# Patient Record
Sex: MALE | Race: WHITE | NOT HISPANIC OR LATINO | Employment: OTHER | URBAN - METROPOLITAN AREA
[De-identification: names, ages, dates, MRNs, and addresses within clinical notes are randomized per-mention and may not be internally consistent; named-entity substitution may affect disease eponyms.]

---

## 2017-05-05 ENCOUNTER — TRANSFERRED RECORDS (OUTPATIENT)
Dept: HEALTH INFORMATION MANAGEMENT | Facility: CLINIC | Age: 67
End: 2017-05-05

## 2017-07-21 ENCOUNTER — TRANSFERRED RECORDS (OUTPATIENT)
Dept: HEALTH INFORMATION MANAGEMENT | Facility: CLINIC | Age: 67
End: 2017-07-21

## 2017-07-27 ENCOUNTER — TRANSFERRED RECORDS (OUTPATIENT)
Dept: HEALTH INFORMATION MANAGEMENT | Facility: CLINIC | Age: 67
End: 2017-07-27

## 2017-07-28 ENCOUNTER — TRANSFERRED RECORDS (OUTPATIENT)
Dept: HEALTH INFORMATION MANAGEMENT | Facility: CLINIC | Age: 67
End: 2017-07-28

## 2017-08-24 ENCOUNTER — TRANSFERRED RECORDS (OUTPATIENT)
Dept: HEALTH INFORMATION MANAGEMENT | Facility: CLINIC | Age: 67
End: 2017-08-24

## 2017-08-31 ENCOUNTER — TRANSFERRED RECORDS (OUTPATIENT)
Dept: HEALTH INFORMATION MANAGEMENT | Facility: CLINIC | Age: 67
End: 2017-08-31

## 2017-09-01 ENCOUNTER — TRANSFERRED RECORDS (OUTPATIENT)
Dept: HEALTH INFORMATION MANAGEMENT | Facility: CLINIC | Age: 67
End: 2017-09-01

## 2017-09-10 ENCOUNTER — TRANSFERRED RECORDS (OUTPATIENT)
Dept: HEALTH INFORMATION MANAGEMENT | Facility: CLINIC | Age: 67
End: 2017-09-10

## 2017-09-13 ENCOUNTER — TRANSFERRED RECORDS (OUTPATIENT)
Dept: HEALTH INFORMATION MANAGEMENT | Facility: CLINIC | Age: 67
End: 2017-09-13

## 2017-09-21 ENCOUNTER — TRANSFERRED RECORDS (OUTPATIENT)
Dept: HEALTH INFORMATION MANAGEMENT | Facility: CLINIC | Age: 67
End: 2017-09-21

## 2019-12-06 ENCOUNTER — DOCUMENTATION ONLY (OUTPATIENT)
Dept: CARE COORDINATION | Facility: CLINIC | Age: 69
End: 2019-12-06

## 2019-12-06 NOTE — TELEPHONE ENCOUNTER
MEDICAL RECORDS REQUEST   [facility]  Urology Clinic  [address] [place] [address]  PHONE: [phone]  Fax: [phone]        FUTURE VISIT INFORMATION                                                   TOI Caruso: 1950 scheduled for future visit at [facility]    APPOINTMENT INFORMATION:    Date: 20 3PM    Provider:  Sury Hartman MD    Reason for Visit/Diagnosis: Elevated PSA    REFERRAL INFORMATION:    Referring provider:  Self    Specialty: N/A    Referring providers clinic:  N/A    Clinic contact number:  N/A    RECORDS REQUESTED FOR VISIT                                                     NOTES  STATUS/DETAILS   OFFICE NOTE from referring provider  no   OFFICE NOTE from other specialist  yes   DISCHARGE SUMMARY from hospital  yes   DISCHARGE REPORT from the ER  yes   OPERATIVE REPORT  yes   MEDICATION LIST  yes   LABS     URINALYSIS (UA) / PSA  yes     PRE-VISIT CHECKLIST      Record collection complete YES- [facility] recs scanned in epic   Images in  PACS    Appointment appropriately scheduled           (right time/right provider) Yes   MyChart activation If no, please explain: In process    Questionnaire complete If no, please explain: In process      Completed by: Delilah Chambers

## 2020-01-10 ENCOUNTER — PRE VISIT (OUTPATIENT)
Dept: UROLOGY | Facility: CLINIC | Age: 70
End: 2020-01-10

## 2020-01-10 DIAGNOSIS — R97.20 ELEVATED PROSTATE SPECIFIC ANTIGEN (PSA): Primary | ICD-10-CM

## 2020-01-10 NOTE — TELEPHONE ENCOUNTER
Chief Complaint : New-From Self     Hx/Sx: Elevated PSA    Records/Orders: PSA Requested     Pt Contacted: Called on 1/10 and VM about New PSA    At Rooming: Normal

## 2020-01-17 ENCOUNTER — PRE VISIT (OUTPATIENT)
Dept: UROLOGY | Facility: CLINIC | Age: 70
End: 2020-01-17

## 2020-02-12 DIAGNOSIS — R97.20 ELEVATED PROSTATE SPECIFIC ANTIGEN (PSA): ICD-10-CM

## 2020-02-12 LAB — PSA SERPL-MCNC: 14.7 UG/L (ref 0–4)

## 2020-02-12 PROCEDURE — 36415 COLL VENOUS BLD VENIPUNCTURE: CPT | Performed by: UROLOGY

## 2020-02-12 PROCEDURE — 84153 ASSAY OF PSA TOTAL: CPT | Performed by: UROLOGY

## 2020-02-14 ENCOUNTER — OFFICE VISIT (OUTPATIENT)
Dept: UROLOGY | Facility: CLINIC | Age: 70
End: 2020-02-14
Payer: COMMERCIAL

## 2020-02-14 VITALS — HEART RATE: 80 BPM | DIASTOLIC BLOOD PRESSURE: 71 MMHG | SYSTOLIC BLOOD PRESSURE: 142 MMHG

## 2020-02-14 DIAGNOSIS — R39.15 URINARY URGENCY: ICD-10-CM

## 2020-02-14 DIAGNOSIS — N20.0 CALCULUS OF KIDNEY: ICD-10-CM

## 2020-02-14 DIAGNOSIS — R97.20 ELEVATED PROSTATE SPECIFIC ANTIGEN (PSA): Primary | ICD-10-CM

## 2020-02-14 RX ORDER — TRAMADOL HYDROCHLORIDE 50 MG/1
50 TABLET ORAL 4 TIMES DAILY PRN
COMMUNITY
Start: 2019-08-22 | End: 2024-06-25

## 2020-02-14 RX ORDER — ATORVASTATIN CALCIUM 10 MG/1
10 TABLET, FILM COATED ORAL DAILY
COMMUNITY
Start: 2019-12-06

## 2020-02-14 RX ORDER — TAMSULOSIN HYDROCHLORIDE 0.4 MG/1
0.4 CAPSULE ORAL DAILY
Status: ON HOLD | COMMUNITY
Start: 2020-01-13 | End: 2022-06-15

## 2020-02-14 ASSESSMENT — ENCOUNTER SYMPTOMS
MEMORY LOSS: 0
BACK PAIN: 0
PARALYSIS: 0
INCREASED ENERGY: 0
DISTURBANCES IN COORDINATION: 0
NUMBNESS: 0
FATIGUE: 0
DECREASED APPETITE: 0
NECK MASS: 0
FLANK PAIN: 0
DYSURIA: 0
POLYDIPSIA: 0
SPEECH CHANGE: 0
NECK PAIN: 1
MUSCLE WEAKNESS: 0
MUSCLE CRAMPS: 0
SINUS CONGESTION: 0
NIGHT SWEATS: 0
ALTERED TEMPERATURE REGULATION: 0
DIFFICULTY URINATING: 0
SEIZURES: 0
TROUBLE SWALLOWING: 0
HEADACHES: 1
HALLUCINATIONS: 0
SORE THROAT: 0
TASTE DISTURBANCE: 0
FEVER: 0
TREMORS: 0
STIFFNESS: 1
SMELL DISTURBANCE: 0
WEIGHT LOSS: 0
JOINT SWELLING: 1
HEMATURIA: 0
SINUS PAIN: 0
HOARSE VOICE: 0
MYALGIAS: 0
TINGLING: 0
WEIGHT GAIN: 0
ARTHRALGIAS: 1
DIZZINESS: 0
WEAKNESS: 0
LOSS OF CONSCIOUSNESS: 0

## 2020-02-14 ASSESSMENT — PAIN SCALES - GENERAL: PAINLEVEL: NO PAIN (0)

## 2020-02-14 NOTE — PATIENT INSTRUCTIONS
Please have MRI and CT completed. Follow up with Dr. Hartman for a cystoscopy appointment within the next few weeks.    It was a pleasure meeting with you today.  Thank you for allowing me and my team the privilege of caring for you today.  YOU are the reason we are here, and I truly hope we provided you with the excellent service you deserve.  Please let us know if there is anything else we can do for you so that we can be sure you are leaving completely satisfied with your care experience.        Balwinder Aldana, EMT

## 2020-02-14 NOTE — NURSING NOTE
Chief Complaint   Patient presents with     Consult For     Elevated PSA       Blood pressure (!) 142/71, pulse 80. There is no height or weight on file to calculate BMI.    There is no problem list on file for this patient.      No Known Allergies    Current Outpatient Medications   Medication Sig Dispense Refill     clonazePAM (KLONOPIN) 1 MG tablet Take 1 tablet by mouth 2 times daily. 60 tablet 5     FLUoxetine HCl, PMDD, 20 MG CAPS Take 4 capsules by mouth daily. 120 capsule 11     tamsulosin (FLOMAX) 0.4 MG capsule Take 0.4 mg by mouth once       traMADol (ULTRAM) 50 MG tablet Take 50 mg by mouth 4 times daily       atorvastatin (LIPITOR) 10 MG tablet Take 10 mg by mouth         Social History     Tobacco Use     Smoking status: Never Smoker     Smokeless tobacco: Never Used   Substance Use Topics     Alcohol use: None     Drug use: None       Balwinder Aldana, EMT  2/14/2020  12:13 PM

## 2020-02-14 NOTE — LETTER
2/14/2020       RE: Boris Zambrano  [address]  [facility] [address]     Dear Colleague,    Thank you for referring your patient, Boris Zambrano, to the [facility] UROLOGY AND INST FOR PROSTATE AND UROLOGIC CANCERS at [facility]. Please see a copy of my visit note below.    It was my pleasure to see Boris Zambrano a 69 year old year old male today. Patient was seen in consultation for elevated PSA and lower urinary tract symptoms.    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     04/2014 7.71  04/2015 11.64  05/2017 11.08  07/2018 11.6  08/2019 16.19  (patient brought these values that he had recorded)     02/2020 14.7      Has had four biopsies in the past but last one was several years ago.     Patient with lower urinary tract symptoms including frequency, taking tamsulosin.   Patient has had two episodes of UUI after driving, last episode was three weeks ago.      We discussed imaging to be obtained, elevated PSA, repeat prostate biopsy, cystoscopy, outlet procedure.             No past medical history on file.    No past surgical history on file.    No family history on file.    Current Outpatient Medications   Medication Sig Dispense Refill     clonazePAM (KLONOPIN) 1 MG tablet Take 1 tablet by mouth 2 times daily. 60 tablet 5     FLUoxetine HCl, PMDD, 20 MG CAPS Take 4 capsules by mouth daily. 120 capsule 11     tamsulosin (FLOMAX) 0.4 MG capsule Take 0.4 mg by mouth once       traMADol (ULTRAM) 50 MG tablet Take 50 mg by mouth 4 times daily       atorvastatin (LIPITOR) 10 MG tablet Take 10 mg by mouth         ALLERGIES: Patient has no known allergies.      REVIEW OF SYSTEMS:  Skin: negative  Eyes: negative  Ears/Nose/Throat: negative  Respiratory: No shortness of breath, dyspnea on exertion, cough, or  hemoptysis  Cardiovascular: negative  Gastrointestinal: negative  Genitourinary: as above  Musculoskeletal: negative  Neurologic: negative  Psychiatric: negative  Hematologic/Lymphatic/Immunologic: negative  Endocrine: negative      GENERAL PHYSICAL EXAM:   Vitals: BP (!) 142/71 (BP Location: Right arm, Patient Position: Sitting)   Pulse 80   There is no height or weight on file to calculate BMI.  Constitutional: healthy, alert and no distress  Head: Normocephalic  Neck: Neck supple  Cardiovascular: negative  Respiratory: negative  Gastrointestinal: Abdomen soft, non-tender  Musculoskeletal: extremities normal  Skin: no suspicious lesions or rashes  Neurologic: Gait normal  Psychiatric: affect normal/bright and mentation appears normal.       EXAM:   Left Flank: negative  Right Flank: negative  Inguinal Area: normal      RECTAL EXAM:   Size: 1-2+   Sphincter tone: normal  Tenderness: Absent  Rectal Mass: Absent  Prostate Nodule: Absent         RADIOLOGY: The following tests were reviewed: Need to complete the following Radiology exams prior to the office appointment:  LABS: The last test results for Needs to complete the necessary tests prior to appointment: were reviewed.     ASSESSMENT: 68 y/o M with history of staghorn calculus, elevated PSA, lower urinary tract symptoms     PLAN:   MRI prostate to evaluate for areas suspicious for prostate cancer   CT scan to evaluate for stone disease   Will schedule for cystoscopy to evaluate for prostatic obstruction       I spent over 30 minutes with the patient.  Over half this time was spent on counseling for elevated PSA, renal calculus, lower urinary tract symptoms.       Answers for HPI/ROS submitted by the patient on 2/14/2020   General Symptoms: Yes  Skin Symptoms: No  HENT Symptoms: Yes  EYE SYMPTOMS: No  HEART SYMPTOMS: No  LUNG SYMPTOMS: No  INTESTINAL SYMPTOMS: No  URINARY SYMPTOMS: Yes  REPRODUCTIVE SYMPTOMS: No  SKELETAL SYMPTOMS: Yes  BLOOD SYMPTOMS:  No  NERVOUS SYSTEM SYMPTOMS: Yes  MENTAL HEALTH SYMPTOMS: Yes  Fever: No  Loss of appetite: No  Weight loss: No  Weight gain: No  Fatigue: No  Night sweats: No  Excessive thirst: No  Feeling hot or cold when others believe the temperature is normal: No  Loss of height: No  Post-operative complications: No  Surgical site pain: No  Hallucinations: No  Change in or Loss of Energy: No  Hyperactivity: No  Confusion: No  Ear pain: No  Ear discharge: No  Hearing loss: No  Tinnitus: Yes  Nosebleeds: No  Congestion: No  Sinus pain: No  Trouble swallowing: No   Voice hoarseness: No  Mouth sores: No  Sore throat: No  Tooth pain: No  Gum tenderness: No  Bleeding gums: No  Change in taste: No  Change in sense of smell: No  Dry mouth: No  Hearing aid used: No  Neck lump: No  Trouble holding urine or incontinence: Yes  Pain or burning: No  Trouble starting or stopping: Yes  Increased frequency of urination: No  Blood in urine: No  Decreased frequency of urination: No  Frequent nighttime urination: Yes  Flank pain: No  Difficulty emptying bladder: No  Back pain: No  Muscle aches: No  Neck pain: Yes  Swollen joints: Yes  Joint pain: Yes  Bone pain: No  Muscle cramps: No  Muscle weakness: No  Joint stiffness: Yes  Bone fracture: No  Trouble with coordination: No  Dizziness or trouble with balance: No  Fainting or black-out spells: No  Memory loss: No  Headache: Yes  Seizures: No  Speech problems: No  Tingling: No  Tremor: No  Weakness: No  Difficulty walking: No  Paralysis: No  Numbness: No      Again, thank you for allowing me to participate in the care of your patient.      Sincerely,    Sury Hartman MD

## 2020-02-14 NOTE — PROGRESS NOTES
It was my pleasure to see Boris Zambrano a 69 year old year old male today. Patient was seen in consultation for elevated PSA and lower urinary tract symptoms.    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     04/2014 7.71  04/2015 11.64  05/2017 11.08  07/2018 11.6  08/2019 16.19  (patient brought these values that he had recorded)     02/2020 14.7      Has had four biopsies in the past but last one was several years ago.     Patient with lower urinary tract symptoms including frequency, taking tamsulosin.   Patient has had two episodes of UUI after driving, last episode was three weeks ago.      We discussed imaging to be obtained, elevated PSA, repeat prostate biopsy, cystoscopy, outlet procedure.             No past medical history on file.    No past surgical history on file.    No family history on file.    Current Outpatient Medications   Medication Sig Dispense Refill     clonazePAM (KLONOPIN) 1 MG tablet Take 1 tablet by mouth 2 times daily. 60 tablet 5     FLUoxetine HCl, PMDD, 20 MG CAPS Take 4 capsules by mouth daily. 120 capsule 11     tamsulosin (FLOMAX) 0.4 MG capsule Take 0.4 mg by mouth once       traMADol (ULTRAM) 50 MG tablet Take 50 mg by mouth 4 times daily       atorvastatin (LIPITOR) 10 MG tablet Take 10 mg by mouth         ALLERGIES: Patient has no known allergies.      REVIEW OF SYSTEMS:  Skin: negative  Eyes: negative  Ears/Nose/Throat: negative  Respiratory: No shortness of breath, dyspnea on exertion, cough, or hemoptysis  Cardiovascular: negative  Gastrointestinal: negative  Genitourinary: as above  Musculoskeletal: negative  Neurologic: negative  Psychiatric: negative  Hematologic/Lymphatic/Immunologic: negative  Endocrine: negative      GENERAL PHYSICAL EXAM:   Vitals: BP (!) 142/71 (BP Location: Right arm, Patient Position: Sitting)    Pulse 80   There is no height or weight on file to calculate BMI.  Constitutional: healthy, alert and no distress  Head: Normocephalic  Neck: Neck supple  Cardiovascular: negative  Respiratory: negative  Gastrointestinal: Abdomen soft, non-tender  Musculoskeletal: extremities normal  Skin: no suspicious lesions or rashes  Neurologic: Gait normal  Psychiatric: affect normal/bright and mentation appears normal.       EXAM:   Left Flank: negative  Right Flank: negative  Inguinal Area: normal      RECTAL EXAM:   Size: 1-2+   Sphincter tone: normal  Tenderness: Absent  Rectal Mass: Absent  Prostate Nodule: Absent         RADIOLOGY: The following tests were reviewed: Need to complete the following Radiology exams prior to the office appointment:  LABS: The last test results for Needs to complete the necessary tests prior to appointment: were reviewed.     ASSESSMENT: 68 y/o M with history of staghorn calculus, elevated PSA, lower urinary tract symptoms     PLAN:   MRI prostate to evaluate for areas suspicious for prostate cancer   CT scan to evaluate for stone disease   Will schedule for cystoscopy to evaluate for prostatic obstruction       I spent over 30 minutes with the patient.  Over half this time was spent on counseling for elevated PSA, renal calculus, lower urinary tract symptoms.       Answers for HPI/ROS submitted by the patient on 2/14/2020   General Symptoms: Yes  Skin Symptoms: No  HENT Symptoms: Yes  EYE SYMPTOMS: No  HEART SYMPTOMS: No  LUNG SYMPTOMS: No  INTESTINAL SYMPTOMS: No  URINARY SYMPTOMS: Yes  REPRODUCTIVE SYMPTOMS: No  SKELETAL SYMPTOMS: Yes  BLOOD SYMPTOMS: No  NERVOUS SYSTEM SYMPTOMS: Yes  MENTAL HEALTH SYMPTOMS: Yes  Fever: No  Loss of appetite: No  Weight loss: No  Weight gain: No  Fatigue: No  Night sweats: No  Excessive thirst: No  Feeling hot or cold when others believe the temperature is normal: No  Loss of height: No  Post-operative complications: No  Surgical site pain:  No  Hallucinations: No  Change in or Loss of Energy: No  Hyperactivity: No  Confusion: No  Ear pain: No  Ear discharge: No  Hearing loss: No  Tinnitus: Yes  Nosebleeds: No  Congestion: No  Sinus pain: No  Trouble swallowing: No   Voice hoarseness: No  Mouth sores: No  Sore throat: No  Tooth pain: No  Gum tenderness: No  Bleeding gums: No  Change in taste: No  Change in sense of smell: No  Dry mouth: No  Hearing aid used: No  Neck lump: No  Trouble holding urine or incontinence: Yes  Pain or burning: No  Trouble starting or stopping: Yes  Increased frequency of urination: No  Blood in urine: No  Decreased frequency of urination: No  Frequent nighttime urination: Yes  Flank pain: No  Difficulty emptying bladder: No  Back pain: No  Muscle aches: No  Neck pain: Yes  Swollen joints: Yes  Joint pain: Yes  Bone pain: No  Muscle cramps: No  Muscle weakness: No  Joint stiffness: Yes  Bone fracture: No  Trouble with coordination: No  Dizziness or trouble with balance: No  Fainting or black-out spells: No  Memory loss: No  Headache: Yes  Seizures: No  Speech problems: No  Tingling: No  Tremor: No  Weakness: No  Difficulty walking: No  Paralysis: No  Numbness: No

## 2020-02-19 ENCOUNTER — PRE VISIT (OUTPATIENT)
Dept: UROLOGY | Facility: CLINIC | Age: 70
End: 2020-02-19

## 2020-02-19 NOTE — TELEPHONE ENCOUNTER
Chief Complaint : Return-Cysto    Hx/Sx: LUTS/ Elevated PSA    Records/Orders: Imaging scheduled     Pt Contacted: n/a    At Rooming: Normal

## 2020-02-22 ENCOUNTER — ANCILLARY PROCEDURE (OUTPATIENT)
Dept: MRI IMAGING | Facility: CLINIC | Age: 70
End: 2020-02-22
Attending: UROLOGY
Payer: COMMERCIAL

## 2020-02-22 ENCOUNTER — ANCILLARY PROCEDURE (OUTPATIENT)
Dept: CT IMAGING | Facility: CLINIC | Age: 70
End: 2020-02-22
Attending: UROLOGY
Payer: COMMERCIAL

## 2020-02-22 DIAGNOSIS — N20.0 CALCULUS OF KIDNEY: ICD-10-CM

## 2020-02-22 DIAGNOSIS — R97.20 ELEVATED PROSTATE SPECIFIC ANTIGEN (PSA): ICD-10-CM

## 2020-02-22 RX ORDER — GADOBUTROL 604.72 MG/ML
10 INJECTION INTRAVENOUS ONCE
Status: COMPLETED | OUTPATIENT
Start: 2020-02-22 | End: 2020-02-22

## 2020-02-22 RX ADMIN — GADOBUTROL 9 ML: 604.72 INJECTION INTRAVENOUS at 10:08

## 2020-02-22 NOTE — DISCHARGE INSTRUCTIONS
MRI Contrast Discharge Instructions    The IV contrast you received today will pass out of your body in your  urine. This will happen in the next 24 hours. You will not feel this process.  Your urine will not change color.    Drink at least 4 extra glasses of water or juice today (unless your doctor  has restricted your fluids). This reduces the stress on your kidneys.  You may take your regular medicines.    If you are on dialysis: It is best to have dialysis today.    If you have a reaction: Most reactions happen right away. If you have  any new symptoms after leaving the hospital (such as hives or swelling),  call your hospital at the correct number below. Or call your family doctor.  If you have breathing distress or wheezing, call 911.    Special instructions: ***    I have read and understand the above information.    Signature:______________________________________ Date:___________    Staff:__________________________________________ Date:___________     Time:__________    [place] [facility] Departments:    ___[place]: [phone]  ___[facility]: [phone]  ___[place]: [phone] ___[facility]: [phone]  ___[facility]: [phone]  ___[facility]: [phone]  ___[phone]  ___[facility]: [phone]  ___[phone]

## 2020-03-06 ENCOUNTER — HOSPITAL ENCOUNTER (OUTPATIENT)
Facility: CLINIC | Age: 70
End: 2020-03-06
Attending: UROLOGY | Admitting: UROLOGY
Payer: COMMERCIAL

## 2020-03-06 ENCOUNTER — OFFICE VISIT (OUTPATIENT)
Dept: UROLOGY | Facility: CLINIC | Age: 70
End: 2020-03-06
Payer: COMMERCIAL

## 2020-03-06 ENCOUNTER — ALLIED HEALTH/NURSE VISIT (OUTPATIENT)
Dept: UROLOGY | Facility: CLINIC | Age: 70
End: 2020-03-06
Payer: COMMERCIAL

## 2020-03-06 VITALS
SYSTOLIC BLOOD PRESSURE: 158 MMHG | HEIGHT: 68 IN | BODY MASS INDEX: 28.34 KG/M2 | WEIGHT: 187 LBS | DIASTOLIC BLOOD PRESSURE: 85 MMHG | HEART RATE: 71 BPM

## 2020-03-06 DIAGNOSIS — N40.1 BPH WITH URINARY OBSTRUCTION: ICD-10-CM

## 2020-03-06 DIAGNOSIS — R39.15 URINARY URGENCY: ICD-10-CM

## 2020-03-06 DIAGNOSIS — R97.20 ELEVATED PROSTATE SPECIFIC ANTIGEN (PSA): Primary | ICD-10-CM

## 2020-03-06 DIAGNOSIS — N13.8 BPH WITH URINARY OBSTRUCTION: ICD-10-CM

## 2020-03-06 DIAGNOSIS — R39.15 URINARY URGENCY: Primary | ICD-10-CM

## 2020-03-06 RX ORDER — CEFAZOLIN SODIUM 1 G/50ML
1 INJECTION, SOLUTION INTRAVENOUS SEE ADMIN INSTRUCTIONS
Status: CANCELLED | OUTPATIENT
Start: 2020-03-06

## 2020-03-06 RX ORDER — LIDOCAINE HYDROCHLORIDE 20 MG/ML
10 JELLY TOPICAL ONCE
Status: COMPLETED | OUTPATIENT
Start: 2020-03-06 | End: 2020-03-06

## 2020-03-06 RX ORDER — CEFAZOLIN SODIUM 2 G/50ML
2 SOLUTION INTRAVENOUS
Status: CANCELLED | OUTPATIENT
Start: 2020-03-06

## 2020-03-06 RX ADMIN — LIDOCAINE HYDROCHLORIDE 10 ML: 20 JELLY TOPICAL at 12:04

## 2020-03-06 ASSESSMENT — MIFFLIN-ST. JEOR: SCORE: 1592.73

## 2020-03-06 ASSESSMENT — PAIN SCALES - GENERAL: PAINLEVEL: NO PAIN (0)

## 2020-03-06 NOTE — LETTER
3/6/2020       RE: Borsi Zambrano  [address]  [place] [address]     Dear Colleague,    Thank you for referring your patient, Borsi Zambrano, to the [facility] UROLOGY AND INST FOR PROSTATE AND UROLOGIC CANCERS at [facility]. Please see a copy of my visit note below.    Chief Complaint   Patient presents with     Cystoscopy     LUTS/ Elevated PSA       There were no vitals taken for this visit. There is no height or weight on file to calculate BMI.    There is no problem list on file for this patient.      No Known Allergies    Current Outpatient Medications   Medication Sig Dispense Refill     atorvastatin (LIPITOR) 10 MG tablet Take 10 mg by mouth       clonazePAM (KLONOPIN) 1 MG tablet Take 1 tablet by mouth 2 times daily. 60 tablet 5     FLUoxetine HCl, PMDD, 20 MG CAPS Take 4 capsules by mouth daily. 120 capsule 11     tamsulosin (FLOMAX) 0.4 MG capsule Take 0.4 mg by mouth once       traMADol (ULTRAM) 50 MG tablet Take 50 mg by mouth 4 times daily         Social History     Tobacco Use     Smoking status: Never Smoker     Smokeless tobacco: Never Used   Substance Use Topics     Alcohol use: Not on file     Drug use: Not on file       Invasive Procedure Safety Checklist:    Procedure: Cystoscopy    Action: Complete sections and checkboxes as appropriate.    Pre-procedure:  1. Patient ID Verified with 2 identifiers ([place] and  or MRN) : YES    2. Procedure and site verified with patient/designee (when able) : YES    3. Accurate consent documentation in medical record : YES    4. H&P (or appropriate assessment) documented in medical record : N/A  H&P must be up to 30 days prior to procedure an updated within 24 hours of                 Procedure as applicable.     5. Relevant diagnostic and radiology test results appropriately labeled and displayed as applicable : YES    6. Blood products, implants, devices, and/or special equipment available for the procedure as  applicable : YES    7. Procedure site(s) marked with provider initials [Exclusions: none] : NO    8. Marking not required. Reason : Yes  Procedure does not require site marking    Time Out:     Time-Out performed immediately prior to starting procedure, including verbal and active participation of all team members addressing: YES    1. Correct patient identity.  2. Confirmed that the correct side and site are marked.  3. An accurate procedure to be done.  4. Agreement on the procedure to be done.  5. Correct patient position.  6. Relevant images and results are properly labeled and appropriately displayed.  7. The need to administer antibiotics or fluids for irrigation purposes during the procedure as applicable.  8. Safety precautions based on patient history or medication use.    During Procedure: Verification of correct person, site, and procedure occurs any time the responsibility for care of the patient is transferred to another member of the care team.    The following medication was given:     MEDICATION: Lidocaine Uro-Jet 2% 200mg (20mg/mL)  ROUTE: Urethral   SITE: Urethra   DOSE: 10mL  LOT #: [id number]  : [employer]   EXPIRATION DATE: 11/21  NDC#: [address]-51   Was there drug waste? No    Prior to injection, verified patient identity using patient's name and date of birth.  Due to injection administration, patient instructed to remain in clinic for 15 minutes  afterwards, and to report any adverse reaction to me immediately.    Drug Amount Wasted:  None.  Vial/Syringe: Single dose vial      Pham Ireland CMA  3/6/2020  11:11 AM    PRE-PROCEDURE DIAGNOSIS: urgency and frequency   POST-PROCEDURE DIAGNOSIS: same, BPH   PROCEDURE: Cystoscopy  HISTORY: Boris Zambrano is a 70 year old male with urgency and frequency.   REVIEW OF OFFICE STUDIES (e.g., uroflow or PVR):   DESCRIPTION OF PROCEDURE: After informed consent was obtained, the patient was brought to the procedure room where he was placed in  the supine position with all pressure points well padded.  The penis and scrotum were prepped and draped in a sterile fashion. A flexible cystoscope was introduced through a well-lubricated urethra. Anterior urethra strictures were absent.   The urinary sphincter was intact.  The prostate demonstrated moderate BPH .  Bladder neck was open.   Bladder signififcant for presence of the following:      Diverticuli: absent      Cellules: present scattered       Trabeculation: present 1+       Tumors: absent      Stones: absent  The flexible cystoscope was removed and the findings were described to the patient.   ASSESSMENT AND PLAN: 70 year old male with history of very elevated PSA, staghorn calculus, lower urinary tract symptoms   Will schedule TURP next available     Again, thank you for allowing me to participate in the care of your patient.      Sincerely,    Sury Hartman MD

## 2020-03-06 NOTE — NURSING NOTE
Chief Complaint   Patient presents with     Cystoscopy     LUTS/ Elevated PSA       Pham Ireland MA

## 2020-03-06 NOTE — PROGRESS NOTES
Chief Complaint   Patient presents with     Cystoscopy     LUTS/ Elevated PSA       There were no vitals taken for this visit. There is no height or weight on file to calculate BMI.    There is no problem list on file for this patient.      No Known Allergies    Current Outpatient Medications   Medication Sig Dispense Refill     atorvastatin (LIPITOR) 10 MG tablet Take 10 mg by mouth       clonazePAM (KLONOPIN) 1 MG tablet Take 1 tablet by mouth 2 times daily. 60 tablet 5     FLUoxetine HCl, PMDD, 20 MG CAPS Take 4 capsules by mouth daily. 120 capsule 11     tamsulosin (FLOMAX) 0.4 MG capsule Take 0.4 mg by mouth once       traMADol (ULTRAM) 50 MG tablet Take 50 mg by mouth 4 times daily         Social History     Tobacco Use     Smoking status: Never Smoker     Smokeless tobacco: Never Used   Substance Use Topics     Alcohol use: Not on file     Drug use: Not on file       Invasive Procedure Safety Checklist:    Procedure: Cystoscopy    Action: Complete sections and checkboxes as appropriate.    Pre-procedure:  1. Patient ID Verified with 2 identifiers ([place] and  or MRN) : YES    2. Procedure and site verified with patient/designee (when able) : YES    3. Accurate consent documentation in medical record : YES    4. H&P (or appropriate assessment) documented in medical record : N/A  H&P must be up to 30 days prior to procedure an updated within 24 hours of                 Procedure as applicable.     5. Relevant diagnostic and radiology test results appropriately labeled and displayed as applicable : YES    6. Blood products, implants, devices, and/or special equipment available for the procedure as applicable : YES    7. Procedure site(s) marked with provider initials [Exclusions: none] : NO    8. Marking not required. Reason : Yes  Procedure does not require site marking    Time Out:     Time-Out performed immediately prior to starting procedure, including verbal and active participation of all team members  addressing: YES    1. Correct patient identity.  2. Confirmed that the correct side and site are marked.  3. An accurate procedure to be done.  4. Agreement on the procedure to be done.  5. Correct patient position.  6. Relevant images and results are properly labeled and appropriately displayed.  7. The need to administer antibiotics or fluids for irrigation purposes during the procedure as applicable.  8. Safety precautions based on patient history or medication use.    During Procedure: Verification of correct person, site, and procedure occurs any time the responsibility for care of the patient is transferred to another member of the care team.    The following medication was given:     MEDICATION: Lidocaine Uro-Jet 2% 200mg (20mg/mL)  ROUTE: Urethral   SITE: Urethra   DOSE: 10mL  LOT #: [id number]  : [employer]   EXPIRATION DATE: 11/21  NDC#: [address]-27   Was there drug waste? No    Prior to injection, verified patient identity using patient's name and date of birth.  Due to injection administration, patient instructed to remain in clinic for 15 minutes  afterwards, and to report any adverse reaction to me immediately.    Drug Amount Wasted:  None.  Vial/Syringe: Single dose vial      Pham Ireland CMA  3/6/2020  11:11 AM

## 2020-03-09 NOTE — TELEPHONE ENCOUNTER
FUTURE VISIT INFORMATION      SURGERY INFORMATION:    Date: 20    Location: [facility] OR    Surgeon:  Sury Hartman MD    Anesthesia Type:  General    Procedure: CYSTOSCOPY, WITH TRANSURETHRAL RESECTION PROSTATE     Consult: OV 3/6- Devan    RECORDS REQUESTED FROM:       Primary Care Provider: Boris Rowland MD - [facility]    Most recent EKG+ Tracin17- [facility]oo- requested tracing

## 2020-03-11 DIAGNOSIS — R30.0 DYSURIA: Primary | ICD-10-CM

## 2020-03-11 NOTE — PROGRESS NOTES
Pre Op Teaching Flowsheet       Pre and Post op Patient Education  Relevant Diagnosis:  Urinary retention      Motivation Level:  Asks Questions: Yes  Eager to Learn:  Yes  Cooperative: Yes  Receptive (willing/able to accept information):  Yes  Patient demonstrates understanding of the following:  Date and time of surgery:  April 1st  Location of surgery: TBD  History and Physical and any other testing necessary prior to surgery: Yes,PAC  Required time line for completion of History and Physical and any pre-op testing: Yes    NPO Guidelines: Nothing to eat 8 hours prior to surgery. Can have clear liquids up to 2 hours prior to sugery    Patient demonstrates understanding of the following:  Pre-op bowel prep: N/A  Pre-op showering/scrub information with Hibiclens Soap: Yes  Medications to take the day of surgery:  Per PCP  Blood thinner medications discussed and when to stop (if applicable):  Yes  Diabetes medication management (if applicable):  N/A  Discussed pain control after surgery: pain scale, pain medications and pain management techniques  Infection Prevention: Patient demonstrates understanding of the following:  Patient instructed on hand hygiene:  Yes  Surgical procedure site care taught: Yes  Signs and symptoms of infection taught:  Yes  Wound care will be taught at the time of discharge.  Central venous catheter care will be taught at the time of discharge (if applicable).    Post-op follow-up:  Discussed how to contact the hospital, nurse, and clinic scheduling staff if necessary.    Instructional materials used/given/mailed:  [facility] Booklet, post op teaching sheet, Map, Soap, and arrival/location information.    Surgical instructions given to patient in clinic: Yes.    Instructional Materials given:  Before your surgery packet , Medications to avoid before surgery , Showering or Bathing instructions before surgery  and What to expect after surgery  Total time with patient: 10 minutes    Marcella  Jean Claude SHANNON

## 2020-03-11 NOTE — PROGRESS NOTES
PRE-PROCEDURE DIAGNOSIS: urgency and frequency   POST-PROCEDURE DIAGNOSIS: same, BPH   PROCEDURE: Cystoscopy  HISTORY: Boris Zambrano is a 70 year old male with urgency and frequency.   REVIEW OF OFFICE STUDIES (e.g., uroflow or PVR):   DESCRIPTION OF PROCEDURE: After informed consent was obtained, the patient was brought to the procedure room where he was placed in the supine position with all pressure points well padded.  The penis and scrotum were prepped and draped in a sterile fashion. A flexible cystoscope was introduced through a well-lubricated urethra. Anterior urethra strictures were absent.   The urinary sphincter was intact.  The prostate demonstrated moderate BPH .  Bladder neck was open.   Bladder signififcant for presence of the following:      Diverticuli: absent      Cellules: present scattered       Trabeculation: present 1+       Tumors: absent      Stones: absent  The flexible cystoscope was removed and the findings were described to the patient.   ASSESSMENT AND PLAN: 70 year old male with history of very elevated PSA, staghorn calculus, lower urinary tract symptoms   Will schedule TURP next available

## 2020-03-17 ENCOUNTER — TELEPHONE (OUTPATIENT)
Dept: UROLOGY | Facility: CLINIC | Age: 70
End: 2020-03-17

## 2020-03-17 PROBLEM — D62 ACUTE BLOOD LOSS ANEMIA: Status: ACTIVE | Noted: 2017-09-11

## 2020-03-17 PROBLEM — N20.0 CALCULUS OF KIDNEY: Status: ACTIVE | Noted: 2017-07-21

## 2020-03-17 PROBLEM — E66.9 OBESITY: Status: ACTIVE | Noted: 2020-03-17

## 2020-03-17 PROBLEM — M65.4 DE QUERVAIN'S TENOSYNOVITIS, RIGHT: Status: ACTIVE | Noted: 2017-05-26

## 2020-03-17 PROBLEM — Z91.148 CONTROLLED SUBSTANCE AGREEMENT TERMINATED: Status: ACTIVE | Noted: 2018-07-12

## 2020-03-17 PROBLEM — N62 GYNECOMASTIA: Status: ACTIVE | Noted: 2020-03-17

## 2020-03-17 PROBLEM — M15.9 OSTEOARTHRITIS OF MULTIPLE JOINTS: Status: ACTIVE | Noted: 2020-03-17

## 2020-03-17 PROBLEM — M18.11 ARTHRITIS OF CARPOMETACARPAL (CMC) JOINT OF RIGHT THUMB: Status: ACTIVE | Noted: 2017-08-21

## 2020-03-17 PROBLEM — Z96.1 PSEUDOPHAKIA OF BOTH EYES: Status: ACTIVE | Noted: 2018-11-15

## 2020-03-17 PROBLEM — R58 BLEEDING: Status: ACTIVE | Noted: 2017-08-31

## 2020-03-17 PROBLEM — R31.9 HEMATURIA: Status: ACTIVE | Noted: 2017-09-10

## 2020-03-17 PROBLEM — H57.03 MIOSIS: Status: ACTIVE | Noted: 2018-09-20

## 2020-03-17 PROBLEM — N17.9 ACUTE KIDNEY INJURY (H): Status: ACTIVE | Noted: 2017-08-31

## 2020-03-17 PROBLEM — M79.644 PAIN OF RIGHT THUMB: Status: ACTIVE | Noted: 2017-06-14

## 2020-03-17 PROBLEM — F32.A DEPRESSION: Status: ACTIVE | Noted: 2020-03-17

## 2020-03-17 NOTE — TELEPHONE ENCOUNTER
Called and spoke with patient about needing to cancel surgery with Dr. Hartman on 4/1/2020 due to COVID-19. Patient aware  will reach out when able to reschedule

## 2020-03-18 ENCOUNTER — PRE VISIT (OUTPATIENT)
Dept: SURGERY | Facility: CLINIC | Age: 70
End: 2020-03-18

## 2020-07-07 ENCOUNTER — TELEPHONE (OUTPATIENT)
Dept: UROLOGY | Facility: CLINIC | Age: 70
End: 2020-07-07

## 2020-07-07 NOTE — TELEPHONE ENCOUNTER
Called patient to schedule surgery with Dr. Hartman. Patient  Stated he wanted to hold off as he found an over the counter medication that has been helping.

## 2020-11-08 ENCOUNTER — HEALTH MAINTENANCE LETTER (OUTPATIENT)
Age: 70
End: 2020-11-08

## 2021-09-11 ENCOUNTER — HEALTH MAINTENANCE LETTER (OUTPATIENT)
Age: 71
End: 2021-09-11

## 2022-01-01 ENCOUNTER — HEALTH MAINTENANCE LETTER (OUTPATIENT)
Age: 72
End: 2022-01-01

## 2022-01-19 ENCOUNTER — VIRTUAL VISIT (OUTPATIENT)
Dept: UROLOGY | Facility: CLINIC | Age: 72
End: 2022-01-19
Payer: COMMERCIAL

## 2022-01-19 DIAGNOSIS — N40.1 BPH WITH URINARY OBSTRUCTION: Primary | ICD-10-CM

## 2022-01-19 DIAGNOSIS — R31.0 GROSS HEMATURIA: ICD-10-CM

## 2022-01-19 DIAGNOSIS — R97.20 ELEVATED PSA: ICD-10-CM

## 2022-01-19 DIAGNOSIS — N13.8 BPH WITH URINARY OBSTRUCTION: Primary | ICD-10-CM

## 2022-01-19 PROCEDURE — 99215 OFFICE O/P EST HI 40 MIN: CPT | Mod: 95 | Performed by: UROLOGY

## 2022-01-19 RX ORDER — LISINOPRIL 10 MG/1
1 TABLET ORAL DAILY
COMMUNITY
Start: 2021-07-20

## 2022-01-19 RX ORDER — COLCHICINE 0.6 MG/1
TABLET ORAL
COMMUNITY
Start: 2022-01-11 | End: 2022-06-14

## 2022-01-19 RX ORDER — CYCLOSPORINE 0.5 MG/ML
1 EMULSION OPHTHALMIC 2 TIMES DAILY
COMMUNITY
Start: 2021-12-23

## 2022-01-19 RX ORDER — FAMOTIDINE 20 MG/1
TABLET, FILM COATED ORAL
COMMUNITY
Start: 2021-12-05 | End: 2022-06-14

## 2022-01-19 NOTE — PROGRESS NOTES
"  Boris Zambrano  who is being evaluated via a billable video visit.      How would you like to obtain your AVS? MyChart  If the video visit is dropped, the invitation should be resent by: Text to cell phone: [phone]  Will anyone else be joining your video visit? No    Video-Visit Details    Type of service:  Video Visit    Video Start Time: 2:28 PM    Video End Time:3:01 PM    Originating Location (pt. Location): Home    Distant Location (provider location):  [facility]     Platform used for Video Visit: AmWell, Doximity used as Amwell was unstable.      HPI:  Boris Zambrano is a 71 year old male being seen for follow-up urology issues.    History of BPH symptoms and elevated PSA.  Prostate MRI was pirads 2.    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      He has trouble postponing, but can void well at this time.    PSA history  (from old records)   04/2014           7.71  04/2015           11.64  05/2017           11.08  07/2018           11.6  08/2019           16.19       02/2020           14.7  8/2020  13.24  11/2020 13.0  10/2021 17.04    Has had prostate biopsy several times, these were negative.  He has been on over-the-counter \"Prostagenix\" , feels this helps his lower urinary tract symptoms.      1/2/22- he voided, noted gross painless hematuria. Urine now clear.    In the past took tamsulosin 0.4mg QD.- not taking now.    Exam:  General- Alert, oriented, nad.  Pleasant and conversant.  Eyes- anicteric, EOMI.  Resps- normal, non-labored.  No cough  Abdomen-  nondistended.   exam- deferred.   Neurological - no tremors  Skin - no discoloration/ lesions noted  Psychiatric - no anxiety, alert & oriented.      The rest of a comprehensive physical examination is deferred due to video visit restrictions.      Review of " Imaging:  The following imaging exams were independently viewed and interpreted by me and discussed with patient:  CT abdomen/pelvis 2/22/20  IMPRESSION:    1. High density postoperative material in the right inferior pole,  with 2 components extending toward the renal collecting system. No  renal stone identified in either kidney.  2. No hydronephrosis or significant perinephric stranding.  3. Diverticulosis without CT evidence of diverticulitis.    Prostate MRI 2/22/20 IMPRESSION:  1. Based on the most suspicious abnormality, this exam is  characterized as PIRADS 2 - Clinically significant cancer is unlikely  to be present.   2. No suspicious adenopathy or evidence of pelvic metastases.    CTAP 1/12/22 IMPRESSION:   1.  No evidence of urinary calculi or other filling defects in the   upper urinary tracts.   2.  Marked enlargement of the prostate gland with prominent median   lobe.   3.  Mild diffuse bladder wall thickening likely due to hypertrophy   from bladder outlet obstruction.   4.  Small benign renal cysts bilaterally. No follow-up needed. The   kidneys are otherwise normal.   5.  Moderate sigmoid diverticulosis.        Review of Labs:  The following labs were reviewed by me and discussed with the patient:  Reviewed multiple PSA tests.    Assessment & Plan   1. History of staghorn kidney stone, no stones on recent CTAP.  2. History of BPH, lower urinary tract symptoms, hard to postpone voiding.  3. Elevated PSA, rising with time.  History of multiple negative prostate biopsies in the past, none in a few years, however.  4. Gross hematuria, likely from enlarged prostate.    Referral to Dr. So / Michael / Edmund for consideration of prostate biopsy, office cystoscopy, TURP/ HoLEP.      Chris Gonzalez MD  [facility]      ==========================      Additional Coding Information:    Problems:  4 -- one or more chronic illnesses with exacerbation or side effects    Data  Reviewed  Review of the result(s) of each unique test - > 3 studies reviewed.    Tests ordered: N/A     Level of risk:  3 -- low risk (e.g., OTC medication or observation, minor surgery without risks)    Time spent:  45 minutes spent on the date of the encounter doing chart review, history and exam, documentation and further activities per the note

## 2022-01-20 ENCOUNTER — MYC MEDICAL ADVICE (OUTPATIENT)
Dept: PEDIATRICS | Facility: CLINIC | Age: 72
End: 2022-01-20
Payer: COMMERCIAL

## 2022-02-04 NOTE — TELEPHONE ENCOUNTER
Pt understood that Dr. Gonzalez didn't want to do a biopsy so Pt is leaning on doing the TURP procedure.  He doesn't understand which DrGeronimo to schedule an appointment with for the TURP procedure.  Please call him back to discuss.  Thanks.

## 2022-02-04 NOTE — TELEPHONE ENCOUNTER
Called patient and assisted scheduling a consult with Dr. Shaffer on 2/10/22. No further questions.    Jessica Corral LPN on 2/4/2022 at 3:04 PM

## 2022-02-10 ENCOUNTER — OFFICE VISIT (OUTPATIENT)
Dept: UROLOGY | Facility: CLINIC | Age: 72
End: 2022-02-10
Payer: COMMERCIAL

## 2022-02-10 DIAGNOSIS — N40.1 BPH WITH URINARY OBSTRUCTION: ICD-10-CM

## 2022-02-10 DIAGNOSIS — N13.8 BPH WITH URINARY OBSTRUCTION: ICD-10-CM

## 2022-02-10 DIAGNOSIS — R97.20 ELEVATED PROSTATE SPECIFIC ANTIGEN (PSA): Primary | ICD-10-CM

## 2022-02-10 DIAGNOSIS — R31.0 GROSS HEMATURIA: ICD-10-CM

## 2022-02-10 PROCEDURE — 99214 OFFICE O/P EST MOD 30 MIN: CPT | Performed by: UROLOGY

## 2022-02-10 NOTE — PROGRESS NOTES
"[facility]  CHIEF COMPLAINT   It was my pleasure to see Boris Zambrano who is a 71 year old male for follow-up of Elevated PSA, gross hematuria.      HPI   Boris Zambrano is a very pleasant 71 year old male   Last saw Dr. Gonzalez - 1/19/22:  Boris Zambrano is a 71 year old male being seen for follow-up urology issues.     History of BPH symptoms and elevated PSA.  Prostate MRI was pirads 2.     PCNL 2017: history of staghorn calculus s/p PCNL at [facility] with Dr. 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       He has trouble postponing, but can void well at this time.    Has had prostate biopsy several times, these were negative.  He has been on over-the-counter \"Prostagenix\" , feels this helps his lower urinary tract symptoms.     1/2/22- he voided, noted gross painless hematuria. Urine now clear.     In the past took tamsulosin 0.4mg QD.- not taking now.    TODAY 2/10/22:  His biggest issues is urgency  He drinks gatorade, water, and 1 cup of coffee  He does drink about 1 gallon in total - for kidney stone prevention  He did have an episode of gross hematuria in January    Has had 4 biopsies - he reports last biopsy in 1980s    PHYSICAL EXAM  Patient is a 71 year old  male   Vitals: There were no vitals taken for this visit.  There is no height or weight on file to calculate BMI.  General Appearance Adult:   Alert, no acute distress, oriented  HENT: throat/mouth:normal, good dentition  Lungs: no respiratory distress, or pursed lip breathing  Heart: No obvious jugular venous distension present  Abdomen: soft, nontender, no organomegaly or masses  Musculoskeltal: extremities normal, no peripheral edema  Skin: no suspicious lesions or rashes  Neuro: Alert, oriented, speech and mentation normal  Psych: affect and mood normal  Gait: Normal    PSA history  (from old records) "   04/2014           7.71  04/2015           11.64  05/2017           11.08  07/2018           11.6  08/2019           16.19  02/2020           14.7  8/2020             13.24  11/2020           13.0  10/2021           17.04    CR history:  1/7/2021 1.20  10/22/2021 1.31  2/17/2021 1.27    IMAGING:  All pertinent imaging reviewed:    All imaging studies reviewed by me.  I personally reviewed these imaging films.  A formal report from radiology will follow.    MRI PROSTATE 2/22/20:  FINDINGS:  Size: 4.3 x 5.3 x 7.5 cm peripherally 89 grams  Hemorrhage: Absent  Peripheral zone: Heterogeneous on T2-weighted images. Regions of  mildly decreased signal on ADC or DWI which are best characterized as  PI-RADS 2 without highly suspicious lesion.  Transition zone: Enlarged with BPH changes. Transition zone nodules  which are circumscribed or mostly encapsulated without diffusion  restriction. PI-RADS 2. No highly suspicious nodules.     Neurovascular bundles: No neurovascular bundle involvement by  malignancy.  Seminal vesicles: No seminal vesicle involvement by malignancy.  Lymph nodes: No lymph node involvement.  Bones: No suspicious lesions.  Other pelvic organs: Small fat-containing left inguinal hernia. Mild  colonic diverticulosis. Rectal tube in place. Degenerative endplate  change in the lower lumbar spine.                                                          IMPRESSION:  1. Based on the most suspicious abnormality, this exam is  characterized as PIRADS 2 - Clinically significant cancer is unlikely  to be present.   2. No suspicious adenopathy or evidence of pelvic metastases.    CT 1/12/22:  IMPRESSION:   1.  No evidence of urinary calculi or other filling defects in the   upper urinary tracts.   2.  Marked enlargement of the prostate gland with prominent median   lobe.   3.  Mild diffuse bladder wall thickening likely due to hypertrophy   from bladder outlet obstruction.   4.  Small benign renal cysts bilaterally.  No follow-up needed. The   kidneys are otherwise normal.   5.  Moderate sigmoid diverticulosis.      ASSESSMENT and PLAN  71-year-old man with history of marked prostatic hypertrophy, BPH, gross hematuria and elevated PSA with prior history of kidney stones    Gross hematuria  -We discussed the need for a hematuria work-up and order for CT urogram placed  -Follow-up for office cystoscopy  -This is an undiagnosed new problem with an uncertain prognosis    Elevated PSA  -I reviewed his PSA history and trend  -He has had prior biopsies, however he reports his last biopsy was in the 1980s  -I reviewed his prior MRI from 2020 which was reassuring with no PI-RADS 3 or greater lesions, however we discussed that given the persistent rise of his PSA I would recommend a repeat  MRI  -Order for MRI placed  -We will discuss this results at the time of his cystoscopy  -We discussed the likely need for a repeat prostate biopsy especially given consideration for bladder outlet procedure    BPH with LUTS  -Prostate measured about 90 g on his most recent MRI from 2020  -Reviewed these images personally and shared them with the patient  -He has significant median lobe with intravesical protrusion  -We will assess on cystoscopy as well and discussed bladder outlet procedures pending his prostate biopsy results    Time spent: 30 minutes spent on the date of the encounter doing chart review, history and exam, documentation and further activities as noted above.    Angel Shaffer MD   Urology  [facility]  [facility] Phone: [phone]  [facility] Phone: [phone]

## 2022-02-10 NOTE — NURSING NOTE
Boris Zambrano's goals for this visit include:   Chief Complaint   Patient presents with     Follow Up     Discuss TURP per Dr. Gonzalez       He requests these members of his care team be copied on today's visit information:     PCP: No Ref-Primary, Physician    Referring Provider:  No referring provider defined for this encounter.    There were no vitals taken for this visit.    Do you need any medication refills at today's visit?     Jessica Corral LPN on 2/10/2022 at 11:00 AM

## 2022-03-01 ENCOUNTER — ANCILLARY PROCEDURE (OUTPATIENT)
Dept: MRI IMAGING | Facility: CLINIC | Age: 72
End: 2022-03-01
Attending: UROLOGY
Payer: COMMERCIAL

## 2022-03-01 ENCOUNTER — ANCILLARY PROCEDURE (OUTPATIENT)
Dept: CT IMAGING | Facility: CLINIC | Age: 72
End: 2022-03-01
Attending: UROLOGY
Payer: COMMERCIAL

## 2022-03-01 DIAGNOSIS — R31.0 GROSS HEMATURIA: ICD-10-CM

## 2022-03-01 DIAGNOSIS — R97.20 ELEVATED PROSTATE SPECIFIC ANTIGEN (PSA): ICD-10-CM

## 2022-03-01 DIAGNOSIS — N13.8 BPH WITH URINARY OBSTRUCTION: ICD-10-CM

## 2022-03-01 DIAGNOSIS — N40.1 BPH WITH URINARY OBSTRUCTION: ICD-10-CM

## 2022-03-01 LAB
CREAT BLD-MCNC: 1.3 MG/DL (ref 0.7–1.3)
GFR SERPL CREATININE-BSD FRML MDRD: 59 ML/MIN/1.73M2

## 2022-03-01 PROCEDURE — 74178 CT ABD&PLV WO CNTR FLWD CNTR: CPT | Performed by: RADIOLOGY

## 2022-03-01 PROCEDURE — A9585 GADOBUTROL INJECTION: HCPCS | Performed by: RADIOLOGY

## 2022-03-01 PROCEDURE — 72197 MRI PELVIS W/O & W/DYE: CPT | Performed by: RADIOLOGY

## 2022-03-01 RX ORDER — GADOBUTROL 604.72 MG/ML
10 INJECTION INTRAVENOUS ONCE
Status: COMPLETED | OUTPATIENT
Start: 2022-03-01 | End: 2022-03-01

## 2022-03-01 RX ORDER — IOPAMIDOL 755 MG/ML
115 INJECTION, SOLUTION INTRAVASCULAR ONCE
Status: COMPLETED | OUTPATIENT
Start: 2022-03-01 | End: 2022-03-01

## 2022-03-01 RX ADMIN — GADOBUTROL 8 ML: 604.72 INJECTION INTRAVENOUS at 17:50

## 2022-03-01 RX ADMIN — IOPAMIDOL 115 ML: 755 INJECTION, SOLUTION INTRAVASCULAR at 16:23

## 2022-03-02 NOTE — DISCHARGE INSTRUCTIONS
MRI Contrast Discharge Instructions    The IV contrast you received today will pass out of your body in your  urine. This will happen in the next 24 hours. You will not feel this process.  Your urine will not change color.    Drink at least 4 extra glasses of water or juice today (unless your doctor  has restricted your fluids). This reduces the stress on your kidneys.  You may take your regular medicines.    If you are on dialysis: It is best to have dialysis today.    If you have a reaction: Most reactions happen right away. If you have  any new symptoms after leaving the hospital (such as hives or swelling),  call your hospital at the correct number below. Or call your family doctor.  If you have breathing distress or wheezing, call 911.    Special instructions: ***    I have read and understand the above information.    Signature:______________________________________ Date:___________    Staff:__________________________________________ Date:___________     Time:__________    [place] [facility] Departments:    ___[facility]: [phone]  ___[facility]: [phone]  ___[place]: [phone] ___[facility]: [phone]  ___[facility]: [phone]  ___[facility]: [phone]  ___[phone]  ___[facility]: [phone]  ___[phone]

## 2022-03-03 ENCOUNTER — OFFICE VISIT (OUTPATIENT)
Dept: UROLOGY | Facility: CLINIC | Age: 72
End: 2022-03-03
Payer: COMMERCIAL

## 2022-03-03 DIAGNOSIS — N40.1 BPH WITH URINARY OBSTRUCTION: ICD-10-CM

## 2022-03-03 DIAGNOSIS — R31.0 GROSS HEMATURIA: Primary | ICD-10-CM

## 2022-03-03 DIAGNOSIS — N13.8 BPH WITH URINARY OBSTRUCTION: ICD-10-CM

## 2022-03-03 DIAGNOSIS — R97.20 ELEVATED PROSTATE SPECIFIC ANTIGEN (PSA): ICD-10-CM

## 2022-03-03 PROCEDURE — 88112 CYTOPATH CELL ENHANCE TECH: CPT | Performed by: PATHOLOGY

## 2022-03-03 PROCEDURE — 52000 CYSTOURETHROSCOPY: CPT | Performed by: UROLOGY

## 2022-03-03 PROCEDURE — 99214 OFFICE O/P EST MOD 30 MIN: CPT | Mod: 25 | Performed by: UROLOGY

## 2022-03-03 RX ORDER — CIPROFLOXACIN 500 MG/1
500 TABLET, FILM COATED ORAL 2 TIMES DAILY
Qty: 6 TABLET | Refills: 0 | Status: SHIPPED | OUTPATIENT
Start: 2022-03-03 | End: 2022-06-14

## 2022-03-03 NOTE — PROCEDURES
CYSTOSCOPY PROCEDURE NOTE:    Boris Zambrano is a 71 year old male  who presents with gross hematuria and BPH with LUTS for cystoscopy.    Pt ID verified with patient: Yes     Procedure verified with patient: Yes     Procedure confirmed with physician and support staff: Yes     Consent form confirmed with physician and support staff.    Sign In  History and Physical Exam reviewed .  Informed Consent Discussed: Yes   Sign in Communication: Yes   Time Out:  Team Confirms the Correct Patient, Correct Procedure; Yes , Correct Site and Site Marking, Correct Position (if applicable).    Affirmation of Time Out: Yes   Sign Out:  Sign Out Discussion: Yes   Physician: Angel Shaffer MD    A urinalysis was performed revealing no evidence of infection.    The benefits, risks, alternatives of the cystoscopy procedure and personnel were discussed with the patient. The verbal consent was obtained and the patient agrees to proceed.      Description of procedure:   After fully informed, voluntary consent was obtained, the patient was brought into the procedure room, identified and placed in a supine position on the cystoscopy table.  The groin/scrotum were prepped with betadine and draped in a sterile fashion.  Urojet lidocaine gel was introduced.  A 15F flexible cystoscope was inserted into the urethra, and the bladder and urethra wereexamined in a systematic manner.  The patient tolerated the procedure well and there were no complications.      Cystoscopic findings:  The urethra was normal without strictures.  The prostate was 5-6cm long and demonstrated significant bilobar hypertrophy.  There was a massive median lobe.  The external sphincter coapted normally and the bladder neck was distorted due to the median lobe. The bladder was  entered and careful pan endoscopy was carried out. The posterior, superior and lateral walls and dome of the bladder were all well visualized and the scope was retroflexed upon itself..  There was  mild trabeculation.  There were no neoplasms, stones, or diverticula identifed.  The ureteric orifices were normal in position and number and effluxing clear urine.    Assessment/Plan:   Boris Zambrano is a 71 year old male with a history of gross hematuria and BPH with LUTS     -See clinic note      Angel Shaffer MD

## 2022-03-03 NOTE — PATIENT INSTRUCTIONS
"After Your Cystoscopy    What happens after the exam?    You may go back to your normal diet and activity as you feel ready, unless your doctor tells you not to.    For the next two days, you may notice:    Some blood in your urine  Some burning when you urinate (use the toilet)  An urge to urinate more often  Bladder spasms    These are normal after the procedure and should go away after a day or two.  To relieve these problems drink 6 to 8 large glasses of water each day (includes drinks at meals) as this will help clear the urine.  Take warm baths to relieve pain and bladder spasms.  Do not add anything to the bath water.  You may also take Tylenol (acetaminophen) for pain if needed.    When should I call my doctor?    A fever over 100F (38C) for more than a day. (Before you call the doctor, check your temperature under your tongue)  Chills  Failure to urinate: No urine comes out when you try to use the toilet. (Try soaking in a bathtub full of warm water. If still no urine, call your doctor)  A lot of blood in the urine, or blood clots larger than a nickel  Pain in the back or belly area (abdomen)  Pain or spasms that are not relieved by warm tub baths and pain medicine  Severe pain, burning or other problems while passing urine  Pain that gets worse after two days            AFTER YOUR CYSTOSCOPY        You have just completed a cystoscopy, or \"cysto\", which allowed your physician to learn more about your bladder (or to remove a stent placed after surgery). We suggest that you continue to avoid caffeine, fruit juice, and alcohol for the next 24 hours, however, you are encouraged to return to your normal activities.         A few things that are considered normal after your cystoscopy:     * Small amount of bleeding (or spotting) that clears within the next 24 hours     * Slight burning sensation with urination     * Sensation to of needing to avoid more frequently     * The feeling of \"air\" in your urine     * " Mild discomfort that is relieved with Tylenol        Please contact our office promptly if you:     * Develop a fever above 101 degrees     * Are unable to urinate     * Develop bright red blood that does not stop     * Severe pain or swelling         Please contact our office with any concerns or questions @[facility].

## 2022-03-03 NOTE — PROGRESS NOTES
"[facility]  CHIEF COMPLAINT   It was my pleasure to see Boris Zambrano who is a 71 year old male for follow-up of Elevated PSA, gross hematuria.      HPI   Boris Zambrano is a very pleasant 71 year old male   Last saw Dr. Gonzalez - 1/19/22:  Boris Zambrano is a 71 year old male being seen for follow-up urology issues.     History of BPH symptoms and elevated PSA.  Prostate MRI was pirads 2.     PCNL 2017: history of staghorn calculus s/p PCNL at [facility] with Dr. 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       He has trouble postponing, but can void well at this time.    Has had prostate biopsy several times, these were negative.  He has been on over-the-counter \"Prostagenix\" , feels this helps his lower urinary tract symptoms.     1/2/22- he voided, noted gross painless hematuria. Urine now clear.     In the past took tamsulosin 0.4mg QD.- not taking now.    2/10/22:  His biggest issues is urgency  He drinks gatorade, water, and 1 cup of coffee  He does drink about 1 gallon in total - for kidney stone prevention  He did have an episode of gross hematuria in January    Has had 4 biopsies - he reports last biopsy in 1980s    TODAY 3/3/22:  Follow-up today for cystoscopy and to review his prostate MRI and CT urogram  He notes continued bothersome urination and is very hopeful that this can be fixed    PHYSICAL EXAM  Patient is a 71 year old  male   Vitals: There were no vitals taken for this visit.  There is no height or weight on file to calculate BMI.  General Appearance Adult:   Alert, no acute distress, oriented  HENT: throat/mouth:normal, good dentition  Lungs: no respiratory distress, or pursed lip breathing  Heart: No obvious jugular venous distension present  Abdomen: soft, nontender, no organomegaly or masses  Musculoskeltal: extremities normal, no peripheral edema  Skin: no suspicious lesions or " rashes  Neuro: Alert, oriented, speech and mentation normal  Psych: affect and mood normal  Gait: Normal    PSA history  (from old records)   04/2014           7.71  04/2015           11.64  05/2017           11.08  07/2018           11.6  08/2019           16.19  02/2020           14.7  8/2020             13.24  11/2020           13.0  10/2021           17.04    CR history:  1/7/2021 1.20  10/22/2021 1.31  2/17/2021 1.27    IMAGING:  All pertinent imaging reviewed:    All imaging studies reviewed by me.  I personally reviewed these imaging films.  A formal report from radiology will follow.    MRI PROSTATE 3/1/22:  FINDINGS:  Size: 4.7 x 5.9 x 7.3 cm, 105.3 grams  Hemorrhage: Absent  Peripheral zone: Heterogeneous on T2-weighted images. Regions of  mildly decreased signal on ADC or DWI which are best characterized as  PI-RADS 2 without highly suspicious lesion.  Transition zone: Enlarged with BPH changes. Transition zone nodules  which are circumscribed or mostly encapsulated without diffusion  restriction.  PI-RADS 2.  No highly suspicious nodules.     Neurovascular bundles: No suspicion of involvement by malignancy  Seminal vesicles: Not involved by tumor.  Lymph nodes: Two sub-5 mm lymph nodes posterior to the bilateral  seminal vesicles (image 7001:[id number] and 33), unchanged since 2/22/2020.  Bones: No suspicious lesions. Degenerative changes of the visualized  cervical spine.  Other pelvic organs: Colonic diverticulosis. Small fat-containing left  inguinal hernia.                                                               IMPRESSION:  1. Based on the most suspicious abnormality, this exam is  characterized as PIRADS 2 - Clinically significant cancer is unlikely  to be present.?  2. Marked prostatomegaly.  3. Colonic diverticulosis.        CT 3/1/22:  IMPRESSION:   1. CT urogram demonstrates no evidence of upper urinary tract  urothelial malignancy or urinary tract stone.  2. Stable embolization material within  the lower pole of the right  kidney.  3. Intermediate density (35 HU), nonenhancing cyst arising from the  left mid kidney is unchanged compared to 2/22/2020, compatible with a  hemorrhagic/proteinaceous cyst.   4. Prostatomegaly.       ASSESSMENT and PLAN  71-year-old man with history of marked prostatic hypertrophy, BPH, gross hematuria and elevated PSA with prior history of kidney stones    Gross hematuria  -I reviewed his CT urogram and reviewed these images personally.  There is no evidence of upper tract urothelial malignancy  -His cystoscopy today was negative for any intravesical abnormalities other than a massively enlarged prostate with significant median lobe  -We discussed most likely etiology of his gross hematuria is from his enlarged prostate    Elevated PSA  -I reviewed his PSA history and trend  -He has had prior biopsies, however he reports his last biopsy was in the 1980s  -I reviewed his updated prostate MRI and reviewed these images personally.  We discussed that there is no evidence of a PI-RADS 3 or greater lesion.  He does have a massively enlarged prostate measuring 105 g  -Given that he has not had a prostate biopsy in more than 2 decades, and that we are considering a bladder outlet surgery, we discussed the need for a prostate biopsy to ensure that there is no prostate cancer  -TRUS/Bx - we discussed the risks and benefits of the prostate biopsy including the normal intermittent hematuria, blood per rectum, and blood with ejaculation as well as a 1-2% risk of post biopsy sepsis requiring hospitalization and IV antibiotics  -Prescription for ciprofloxacin sent to the pharmacy    BPH with LUTS  -Again I reviewed his prostate MRI images personally and agree with the radiologist interpretation  -Cystoscopy today with significant intravesical median lobe  -We discussed treatment options which I would recommend a robotic simple prostatectomy  -We discussed the need to rule out prostate cancer  and will discuss this further after his prostate biopsy    Time spent: 30 minutes spent on the date of the encounter doing chart review, history and exam, documentation and further activities as noted above.  This was in addition to cystoscopy time    Angel Shaffer MD   Urology  [facility]  [facility] Phone: [phone]  [facility] Phone: [phone]

## 2022-03-03 NOTE — NURSING NOTE
Boris Zambrano's goals for this visit include:   Chief Complaint   Patient presents with     Cystoscopy     gross hematuria        He requests these members of his care team be copied on today's visit information:     PCP: No Ref-Primary, Physician    Referring Provider:  No referring provider defined for this encounter.    There were no vitals taken for this visit.    Do you need any medication refills at today's visit?     Jessica Corral LPN on 3/3/2022 at 10:11 AM

## 2022-03-04 ENCOUNTER — TELEPHONE (OUTPATIENT)
Dept: UROLOGY | Facility: CLINIC | Age: 72
End: 2022-03-04
Payer: COMMERCIAL

## 2022-03-04 LAB
PATH REPORT.COMMENTS IMP SPEC: NORMAL
PATH REPORT.FINAL DX SPEC: NORMAL
PATH REPORT.GROSS SPEC: NORMAL
PATH REPORT.RELEVANT HX SPEC: NORMAL

## 2022-03-04 NOTE — TELEPHONE ENCOUNTER
3/4/2022 1st attempt to get patients Biopsy scheduled. I communicated with RN Peg Rosenbaum before calling the patient. Peg placed a hold for patient on 4/21/22 @ 8:30. I called patient and LVM stating that the appt is being held until 3/8/2022.         [facility]   Neurology & Neurosurgery Specialties  [facility]   [phone]

## 2022-04-21 ENCOUNTER — OFFICE VISIT (OUTPATIENT)
Dept: UROLOGY | Facility: CLINIC | Age: 72
End: 2022-04-21
Payer: COMMERCIAL

## 2022-04-21 VITALS — HEART RATE: 85 BPM | SYSTOLIC BLOOD PRESSURE: 134 MMHG | OXYGEN SATURATION: 95 % | DIASTOLIC BLOOD PRESSURE: 77 MMHG

## 2022-04-21 DIAGNOSIS — N13.8 BPH WITH URINARY OBSTRUCTION: ICD-10-CM

## 2022-04-21 DIAGNOSIS — N40.1 BPH WITH URINARY OBSTRUCTION: ICD-10-CM

## 2022-04-21 DIAGNOSIS — R97.20 ELEVATED PSA: Primary | ICD-10-CM

## 2022-04-21 PROCEDURE — 55700 PR BIOPSY OF PROSTATE,NEEDLE/PUNCH: CPT | Performed by: UROLOGY

## 2022-04-21 PROCEDURE — 76872 US TRANSRECTAL: CPT | Performed by: UROLOGY

## 2022-04-21 PROCEDURE — 88305 TISSUE EXAM BY PATHOLOGIST: CPT | Mod: GC | Performed by: PATHOLOGY

## 2022-04-21 RX ORDER — GENTAMICIN 40 MG/ML
80 INJECTION, SOLUTION INTRAMUSCULAR; INTRAVENOUS ONCE
Status: COMPLETED | OUTPATIENT
Start: 2022-04-21 | End: 2022-04-21

## 2022-04-21 RX ADMIN — GENTAMICIN 80 MG: 40 INJECTION, SOLUTION INTRAMUSCULAR; INTRAVENOUS at 08:31

## 2022-04-21 NOTE — PATIENT INSTRUCTIONS
"Precautions Following a Prostate Biopsy    There are four conditions that you should watch for after a prostate biopsy:    1. Excessive Pain  2. Bleeding irregularities (passing \"dime sized\" clots or if your urine looks like cranberry juice)  3. Fever of 100 degrees or more  4. If you are unable to urinate    * If you experience any discomfort following the biopsy, you may take Tylenol.  Do not take Aspirin unless specified by your physician.  If the discomfort becomes severe or uncontrolled by medication, contact the Urology Clinic or Urology Resident (after normal business hours).    * Do not be alarmed if you have some blood in your stool, in the urine, or ejaculate (semen).  This occurrence is normal and may last up to three (3) or four (4) days, usually intermittently.  Blood in the ejaculate (semen) may last several weeks, up to about a dozen ejaculations.  The blood in your ejaculate may appear as brown streaks, blood tinged, and immediately following a biopsy, it may appear bright red.    *If you run a fever above 100 degrees, call the Urology Clinic or Urology Resident (after normal business hours) immediately.  If you are unable to reach your physician or the Resident on call, go to the nearest emergency room.  Explain that you have had a transrectal biopsy of your prostate and what problems you are experiencing.    *You should attempt to urinate following your biopsy before you leave the clinic.  If you are unable to urinate four (4) to six (6) hours after you leave the clinic, you will need to contact the Urology Clinic or the Resident on call.  If you are unable to reach your physician or the Resident on call, go to the nearest emergency room.    If you have any questions or concerns after your biopsy, feel free to contact the Urology Clinic at [phone] during M-F, 8:00-5:00 business hours.  If you need to speak with someone after normal business hours, call [phone] and ask for the Resident " on call for Urology to be paged.

## 2022-04-21 NOTE — NURSING NOTE
Boris Zambrano's goals for this visit include:   Chief Complaint   Patient presents with     Prostate Biopsy     Elevated PSA 17.04       He requests these members of his care team be copied on today's visit information:     PCP: No Ref-Primary, Physician    Referring Provider:  No referring provider defined for this encounter.    /77 (BP Location: Right arm, Patient Position: Sitting, Cuff Size: Adult Large)   Pulse 85   SpO2 95%     Do you need any medication refills at today's visit?     Jessica Corral LPN on 4/21/2022 at 8:28 AM

## 2022-04-21 NOTE — PROGRESS NOTES
PHYSICIANS NOTE: TRANSRECTAL ULTRASOUND AND BIOPSY PROCEDURE: 4/21/2022   [facility]     Sign In   History and Physical Exam reviewed and is unchanged.     Primary Diagnosis: Elevated psa (primary encounter diagnosis)   Prostate cancer     Informed Consent Discussed: Yes. Risks, benefits, alternatives and personnel discussed with patient who consents to proceed.     Sign in Communication: Completed   Time Out: Team Confirms the Correct Patient,   Correct Procedure; Transrectal Ultrasound and Biopsy, Correct Site and Site Marking (not applicable), Correct Position.   Affirmation of Time Out: YES   Sign Out: Sign Out Discussion: Completed   Patient history and ROS confirmed unchanged from last office visit.   Family history for prostate cancer is: unknown   Audible Time Out: Yes     TRUS PROCEDURE WITH BIOPSY     The patient was placed in the lateral decubitus position.     Digital rectal exam was normal.     The ultrasound probe was placed into the rectum and the prostate visualized.   Approximately five cc plain lidocaine was injected bilaterally into the perioprostatic nerves.     The prostate was visualized in both planes and no hypoechoic lesion identified.     The total prostate volume was 112 gm     The patient underwent biopsy removing 12 total cores.     PSA   Date Value Ref Range Status   02/12/2020 14.70 (H) 0 - 4 ug/L Final     Comment:     Assay Method:  Chemiluminescence using Siemens Vista analyzer       ----------     Complications: None     Follow-up: He is on MyChart and would like to receive results before our scheduled visit. Follow up as scheduled.    Angel Shaffer MD

## 2022-04-25 LAB
PATH REPORT.COMMENTS IMP SPEC: NORMAL
PATH REPORT.FINAL DX SPEC: NORMAL
PATH REPORT.GROSS SPEC: NORMAL
PATH REPORT.MICROSCOPIC SPEC OTHER STN: NORMAL
PATH REPORT.RELEVANT HX SPEC: NORMAL
PHOTO IMAGE: NORMAL

## 2022-04-28 ENCOUNTER — OFFICE VISIT (OUTPATIENT)
Dept: UROLOGY | Facility: CLINIC | Age: 72
End: 2022-04-28
Payer: COMMERCIAL

## 2022-04-28 ENCOUNTER — PREP FOR PROCEDURE (OUTPATIENT)
Dept: UROLOGY | Facility: CLINIC | Age: 72
End: 2022-04-28

## 2022-04-28 DIAGNOSIS — N13.8 BPH WITH URINARY OBSTRUCTION: Primary | ICD-10-CM

## 2022-04-28 DIAGNOSIS — N40.1 BPH WITH URINARY OBSTRUCTION: Primary | ICD-10-CM

## 2022-04-28 DIAGNOSIS — R97.20 ELEVATED PSA: ICD-10-CM

## 2022-04-28 DIAGNOSIS — R31.0 GROSS HEMATURIA: ICD-10-CM

## 2022-04-28 PROCEDURE — 99214 OFFICE O/P EST MOD 30 MIN: CPT | Performed by: UROLOGY

## 2022-04-28 NOTE — PROGRESS NOTES
"[facility]  CHIEF COMPLAINT   It was my pleasure to see Boris Zambrano who is a 72 year old male for follow-up of Elevated PSA, gross hematuria.      HPI   Boris Zambrano is a very pleasant 72 year old male   Last saw Dr. Gonzalez - 1/19/22:  Boris Zambrano is a 71 year old male being seen for follow-up urology issues.     History of BPH symptoms and elevated PSA.  Prostate MRI was pirads 2.     PCNL 2017: history of staghorn calculus s/p PCNL at [facility] with Dr. 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       He has trouble postponing, but can void well at this time.    Has had prostate biopsy several times, these were negative.  He has been on over-the-counter \"Prostagenix\" , feels this helps his lower urinary tract symptoms.     1/2/22- he voided, noted gross painless hematuria. Urine now clear.     In the past took tamsulosin 0.4mg QD.- not taking now.    2/10/22:  His biggest issues is urgency  He drinks gatorade, water, and 1 cup of coffee  He does drink about 1 gallon in total - for kidney stone prevention  He did have an episode of gross hematuria in January    Has had 4 biopsies - he reports last biopsy in 1980s    3/3/22:  Follow-up today for cystoscopy and to review his prostate MRI and CT urogram  He notes continued bothersome urination and is very hopeful that this can be fixed    TODAY 4/28/2022:  Follow-up today to review his biopsy results  He did well after the biopsy with no issues  He continues to be very bothered by his urination    PHYSICAL EXAM  Patient is a 72 year old  male   Vitals: There were no vitals taken for this visit.  There is no height or weight on file to calculate BMI.  General Appearance Adult:   Alert, no acute distress, oriented  HENT: throat/mouth:normal, good dentition  Lungs: no respiratory distress, or pursed lip breathing  Heart: No obvious jugular venous distension " present  Abdomen: soft, nontender, no organomegaly or masses  Musculoskeltal: extremities normal, no peripheral edema  Skin: no suspicious lesions or rashes  Neuro: Alert, oriented, speech and mentation normal  Psych: affect and mood normal  Gait: Normal    PSA history  (from old records)   04/2014           7.71  04/2015           11.64  05/2017           11.08  07/2018           11.6  08/2019           16.19  02/2020           14.7  8/2020             13.24  11/2020           13.0  10/2021           17.04    CR history:  1/7/2021 1.20  10/22/2021 1.31  2/17/2021 1.27    PATHOLOGY:  4/21/22:  Final Diagnosis   A. - L.  Prostate, biopsy:  - Benign prostatic tissue        IMAGING:  All pertinent imaging reviewed:    All imaging studies reviewed by me.  I personally reviewed these imaging films.  A formal report from radiology will follow.    MRI PROSTATE 3/1/22:  FINDINGS:  Size: 4.7 x 5.9 x 7.3 cm, 105.3 grams  Hemorrhage: Absent  Peripheral zone: Heterogeneous on T2-weighted images. Regions of  mildly decreased signal on ADC or DWI which are best characterized as  PI-RADS 2 without highly suspicious lesion.  Transition zone: Enlarged with BPH changes. Transition zone nodules  which are circumscribed or mostly encapsulated without diffusion  restriction.  PI-RADS 2.  No highly suspicious nodules.     Neurovascular bundles: No suspicion of involvement by malignancy  Seminal vesicles: Not involved by tumor.  Lymph nodes: Two sub-5 mm lymph nodes posterior to the bilateral  seminal vesicles (image 7001:[id number] and 33), unchanged since 2/22/2020.  Bones: No suspicious lesions. Degenerative changes of the visualized  cervical spine.  Other pelvic organs: Colonic diverticulosis. Small fat-containing left  inguinal hernia.                                                               IMPRESSION:  1. Based on the most suspicious abnormality, this exam is  characterized as PIRADS 2 - Clinically significant cancer is unlikely  to  be present.?  2. Marked prostatomegaly.  3. Colonic diverticulosis.        CT 3/1/22:  IMPRESSION:   1. CT urogram demonstrates no evidence of upper urinary tract  urothelial malignancy or urinary tract stone.  2. Stable embolization material within the lower pole of the right  kidney.  3. Intermediate density (35 HU), nonenhancing cyst arising from the  left mid kidney is unchanged compared to 2/22/2020, compatible with a  hemorrhagic/proteinaceous cyst.   4. Prostatomegaly.       ASSESSMENT and PLAN  72-year-old man with history of marked prostatic hypertrophy, BPH, gross hematuria and elevated PSA with prior history of kidney stones    Gross hematuria  -I reviewed his CT urogram and reviewed these images personally.  There is no evidence of upper tract urothelial malignancy  -His cystoscopy previously was negative for any intravesical abnormalities other than a massively enlarged prostate with significant median lobe  -We discussed most likely etiology of his gross hematuria is from his enlarged prostate    Elevated PSA  -I reviewed his PSA history and trend  -He has had prior biopsies, however he reports his last biopsy was in the 1980s  -I reviewed his updated prostate MRI and reviewed these images personally.  We discussed that there is no evidence of a PI-RADS 3 or greater lesion.  He does have a massively enlarged prostate measuring 105 g  -Reviewed his updated prostate biopsy results which was reassuring with no evidence of malignancy    BPH with LUTS  -Again I reviewed his prostate MRI images personally and agree with the radiologist interpretation  -Cystoscopy previously with significant intravesical median lobe  -We discussed that given his prostate size I would recommend that his surgery options to consider be limited to a robotic simple prostatectomy versus referral for a HoLEP.  We discussed the risks and benefits and differences of these procedures in detail and ultimately he elected to proceed with  a robotic simple prostatectomy  - We discussed the surgical plan as well as the expected postoperative outcome and recovery process.  We discussed that this would take place at [facility] with anticipated hospitalization of 1 night and about 10 days of Dooely catheterization  - Order for surgery placed in a separate encounter as this will take place at [facility]      Time spent: 30 minutes spent on the date of the encounter doing chart review, history and exam, documentation and further activities as noted above.      Angel Shaffer MD   Urology  [facility]  [facility] Phone: [phone]  [facility] Phone: [phone]

## 2022-04-28 NOTE — NURSING NOTE
Boris Zambrano's goals for this visit include:   Chief Complaint   Patient presents with     RECHECK     Follow up biopsy results/        He requests these members of his care team be copied on today's visit information: yes    PCP: No Ref-Primary, Physician    Referring Provider:  No referring provider defined for this encounter.    There were no vitals taken for this visit.    Do you need any medication refills at today's visit? No  Daniel Llanes CMA

## 2022-05-31 ENCOUNTER — TELEPHONE (OUTPATIENT)
Dept: UROLOGY | Facility: CLINIC | Age: 72
End: 2022-05-31
Payer: COMMERCIAL

## 2022-05-31 NOTE — TELEPHONE ENCOUNTER
Spoke to pt. Pt had prostate biopsy done on 4/21/22. Pt has planning upcoming procedure on 6/15/22. He reports noting a red dot in the toilet on Saturday after urinating. Pt reports that he did have bleeding in urine and in rectum following biopsy and is wondering if this should be a concern. Pt reports that no bleeding since Sunday noted. No fever and no pain. No difficulty with urination. Provided pt reassurance that this is normal to see following biopsies for weeks after. Reviewed urgent symptoms. Pt verbalized understanding and feeling reassured. No other questions or concerns noted. Will call clinic as needed.     Jolene Shipley, SHIVA MSN

## 2022-05-31 NOTE — TELEPHONE ENCOUNTER
[facility] Call Center    Phone Message    May a detailed message be left on voicemail: yes     Reason for Call: Symptoms or Concerns     If patient has red-flag symptoms, warm transfer to triage line    Current symptom or concern: Slight blood in urine starting Saturday morning, and worsening throughout the day. Please advise. Thank you.     Symptoms have been present for:  3 week(s)    Has patient previously been seen for this? Yes    [facility]    Date:     Are there any new or worsening symptoms? No      Action Taken: Message routed to:  Adult Clinics: Urology p [id number]    Travel Screening: Not Applicable

## 2022-06-11 ENCOUNTER — LAB (OUTPATIENT)
Dept: URGENT CARE | Facility: URGENT CARE | Age: 72
End: 2022-06-11
Payer: COMMERCIAL

## 2022-06-11 DIAGNOSIS — Z20.822 ENCOUNTER FOR LABORATORY TESTING FOR COVID-19 VIRUS: ICD-10-CM

## 2022-06-11 PROCEDURE — U0003 INFECTIOUS AGENT DETECTION BY NUCLEIC ACID (DNA OR RNA); SEVERE ACUTE RESPIRATORY SYNDROME CORONAVIRUS 2 (SARS-COV-2) (CORONAVIRUS DISEASE [COVID-19]), AMPLIFIED PROBE TECHNIQUE, MAKING USE OF HIGH THROUGHPUT TECHNOLOGIES AS DESCRIBED BY CMS-2020-01-R: HCPCS

## 2022-06-11 PROCEDURE — U0005 INFEC AGEN DETEC AMPLI PROBE: HCPCS

## 2022-06-12 LAB — SARS-COV-2 RNA RESP QL NAA+PROBE: NEGATIVE

## 2022-06-14 ENCOUNTER — ANESTHESIA EVENT (OUTPATIENT)
Dept: SURGERY | Facility: CLINIC | Age: 72
DRG: 707 | End: 2022-06-14
Payer: COMMERCIAL

## 2022-06-14 ENCOUNTER — TELEPHONE (OUTPATIENT)
Dept: UROLOGY | Facility: CLINIC | Age: 72
End: 2022-06-14
Payer: COMMERCIAL

## 2022-06-14 NOTE — TELEPHONE ENCOUNTER
[facility] Call Center    Phone Message    May a detailed message be left on voicemail: yes     Reason for Call: Symptoms or Concerns     If patient has red-flag symptoms, warm transfer to triage line    Current symptom or concern: very light red blood in urine    Symptoms have been present for: 10 hour(s)    Has patient previously been seen for this? Yes    By Nurse call:     Date: 5/31/22    Are there any new or worsening symptoms? Yes: pt jsut noticed the blood at 12:24am and still had light blood in urine.  No temps, no pain, no burning.     Pt does have surgery scheduled tomorrow morning 6/15      Action Taken: Message routed to:  Other: URO    Travel Screening: Not Applicable

## 2022-06-14 NOTE — TELEPHONE ENCOUNTER
"Returned call to patient for follow up regarding reported symptoms of \"very light red blood in urine\"      Patient stated that he is \"going bananas because I don't want my procedure to be cancelled tomorrow.\"  Patient has a prostectomy scheduled for 6/15 with Dr. Shaffer.      Patient reports that at 12:24 am upon urinating in the toilet he noticed urine was \"pinkish\".  Patient denied any pain or discomfort upon voiding.      Patient urinated at 0340 and light pinkish colored urine noted again.    Patient stated at around 0830/0900 urine still \"lightly colored pink\".      Patient stated he hasn't had any pink tinged urine in the past, only once after a prostate biopsy that was performed on April 21st- which was reviewed with Dr. Shaffer.      Patient stated at 12:46pm his urine was not pink but \"haze with some bubbles\".      Denies back pain, denies abdominal pain.  Denies fever.  Feels fine and afebrile.  Denies chills.  No pain.         Reviewed with patient that this writer will consult with Dr. Shaffer regarding patients reported symptoms.  Will communicate any recommendations/plan of care with patient.    Patient verbalized understanding and agreeable to plan of care.    Cinthya España RN on 6/14/2022 at 1:46 PM    "

## 2022-06-15 ENCOUNTER — ANESTHESIA (OUTPATIENT)
Dept: SURGERY | Facility: CLINIC | Age: 72
DRG: 707 | End: 2022-06-15
Payer: COMMERCIAL

## 2022-06-15 ENCOUNTER — HOSPITAL ENCOUNTER (INPATIENT)
Facility: CLINIC | Age: 72
LOS: 2 days | Discharge: HOME | DRG: 707 | End: 2022-06-17
Attending: UROLOGY | Admitting: UROLOGY
Payer: COMMERCIAL

## 2022-06-15 DIAGNOSIS — N13.8 BPH WITH URINARY OBSTRUCTION: Primary | ICD-10-CM

## 2022-06-15 DIAGNOSIS — N40.1 BPH WITH URINARY OBSTRUCTION: Primary | ICD-10-CM

## 2022-06-15 LAB
ABO/RH(D): NORMAL
ANION GAP SERPL CALCULATED.3IONS-SCNC: 8 MMOL/L (ref 3–14)
ANTIBODY SCREEN: NEGATIVE
BUN SERPL-MCNC: 15 MG/DL (ref 7–30)
CALCIUM SERPL-MCNC: 9.2 MG/DL (ref 8.5–10.1)
CHLORIDE BLD-SCNC: 108 MMOL/L (ref 94–109)
CO2 SERPL-SCNC: 25 MMOL/L (ref 20–32)
CREAT SERPL-MCNC: 1.12 MG/DL (ref 0.66–1.25)
ERYTHROCYTE [DISTWIDTH] IN BLOOD BY AUTOMATED COUNT: 13.1 % (ref 10–15)
GFR SERPL CREATININE-BSD FRML MDRD: 71 ML/MIN/1.73M2
GLUCOSE BLD-MCNC: 155 MG/DL (ref 70–99)
HCT VFR BLD AUTO: 40.2 % (ref 40–53)
HGB BLD-MCNC: 13 G/DL (ref 13.3–17.7)
MCH RBC QN AUTO: 29.3 PG (ref 26.5–33)
MCHC RBC AUTO-ENTMCNC: 32.3 G/DL (ref 31.5–36.5)
MCV RBC AUTO: 91 FL (ref 78–100)
PLATELET # BLD AUTO: 203 10E3/UL (ref 150–450)
POTASSIUM BLD-SCNC: 3.4 MMOL/L (ref 3.4–5.3)
POTASSIUM BLD-SCNC: 3.6 MMOL/L (ref 3.4–5.3)
RBC # BLD AUTO: 4.44 10E6/UL (ref 4.4–5.9)
SODIUM SERPL-SCNC: 141 MMOL/L (ref 133–144)
SPECIMEN EXPIRATION DATE: NORMAL
WBC # BLD AUTO: 13.7 10E3/UL (ref 4–11)

## 2022-06-15 PROCEDURE — 710N000009 HC RECOVERY PHASE 1, LEVEL 1, PER MIN: Performed by: UROLOGY

## 2022-06-15 PROCEDURE — 258N000003 HC RX IP 258 OP 636: Performed by: STUDENT IN AN ORGANIZED HEALTH CARE EDUCATION/TRAINING PROGRAM

## 2022-06-15 PROCEDURE — 999N000141 HC STATISTIC PRE-PROCEDURE NURSING ASSESSMENT: Performed by: UROLOGY

## 2022-06-15 PROCEDURE — 250N000011 HC RX IP 250 OP 636: Performed by: ANESTHESIOLOGY

## 2022-06-15 PROCEDURE — 88307 TISSUE EXAM BY PATHOLOGIST: CPT | Mod: TC | Performed by: UROLOGY

## 2022-06-15 PROCEDURE — 250N000011 HC RX IP 250 OP 636: Performed by: UROLOGY

## 2022-06-15 PROCEDURE — 0VT04ZZ RESECTION OF PROSTATE, PERCUTANEOUS ENDOSCOPIC APPROACH: ICD-10-PCS | Performed by: UROLOGY

## 2022-06-15 PROCEDURE — 250N000013 HC RX MED GY IP 250 OP 250 PS 637: Performed by: STUDENT IN AN ORGANIZED HEALTH CARE EDUCATION/TRAINING PROGRAM

## 2022-06-15 PROCEDURE — 84132 ASSAY OF SERUM POTASSIUM: CPT | Performed by: STUDENT IN AN ORGANIZED HEALTH CARE EDUCATION/TRAINING PROGRAM

## 2022-06-15 PROCEDURE — 86901 BLOOD TYPING SEROLOGIC RH(D): CPT | Performed by: ANESTHESIOLOGY

## 2022-06-15 PROCEDURE — 272N000001 HC OR GENERAL SUPPLY STERILE: Performed by: UROLOGY

## 2022-06-15 PROCEDURE — 36415 COLL VENOUS BLD VENIPUNCTURE: CPT | Performed by: ANESTHESIOLOGY

## 2022-06-15 PROCEDURE — 258N000001 HC RX 258: Performed by: UROLOGY

## 2022-06-15 PROCEDURE — 258N000003 HC RX IP 258 OP 636: Performed by: ANESTHESIOLOGY

## 2022-06-15 PROCEDURE — 36415 COLL VENOUS BLD VENIPUNCTURE: CPT | Performed by: STUDENT IN AN ORGANIZED HEALTH CARE EDUCATION/TRAINING PROGRAM

## 2022-06-15 PROCEDURE — 360N000080 HC SURGERY LEVEL 7, PER MIN: Performed by: UROLOGY

## 2022-06-15 PROCEDURE — 250N000009 HC RX 250: Performed by: ANESTHESIOLOGY

## 2022-06-15 PROCEDURE — 250N000009 HC RX 250: Performed by: UROLOGY

## 2022-06-15 PROCEDURE — 86850 RBC ANTIBODY SCREEN: CPT | Performed by: ANESTHESIOLOGY

## 2022-06-15 PROCEDURE — 258N000001 HC RX 258: Performed by: STUDENT IN AN ORGANIZED HEALTH CARE EDUCATION/TRAINING PROGRAM

## 2022-06-15 PROCEDURE — 370N000017 HC ANESTHESIA TECHNICAL FEE, PER MIN: Performed by: UROLOGY

## 2022-06-15 PROCEDURE — 250N000025 HC SEVOFLURANE, PER MIN: Performed by: UROLOGY

## 2022-06-15 PROCEDURE — 8E0W4CZ ROBOTIC ASSISTED PROCEDURE OF TRUNK REGION, PERCUTANEOUS ENDOSCOPIC APPROACH: ICD-10-PCS | Performed by: UROLOGY

## 2022-06-15 PROCEDURE — 55821 REMOVAL OF PROSTATE: CPT | Performed by: UROLOGY

## 2022-06-15 PROCEDURE — 250N000011 HC RX IP 250 OP 636: Performed by: STUDENT IN AN ORGANIZED HEALTH CARE EDUCATION/TRAINING PROGRAM

## 2022-06-15 PROCEDURE — 84132 ASSAY OF SERUM POTASSIUM: CPT | Performed by: ANESTHESIOLOGY

## 2022-06-15 PROCEDURE — 85027 COMPLETE CBC AUTOMATED: CPT | Performed by: STUDENT IN AN ORGANIZED HEALTH CARE EDUCATION/TRAINING PROGRAM

## 2022-06-15 RX ORDER — ONDANSETRON 4 MG/1
4 TABLET, ORALLY DISINTEGRATING ORAL EVERY 30 MIN PRN
Status: DISCONTINUED | OUTPATIENT
Start: 2022-06-15 | End: 2022-06-15 | Stop reason: HOSPADM

## 2022-06-15 RX ORDER — OXYCODONE HYDROCHLORIDE 5 MG/1
10 TABLET ORAL EVERY 4 HOURS PRN
Status: DISCONTINUED | OUTPATIENT
Start: 2022-06-15 | End: 2022-06-17 | Stop reason: HOSPADM

## 2022-06-15 RX ORDER — CLONAZEPAM 1 MG/1
1 TABLET ORAL 2 TIMES DAILY
Status: DISCONTINUED | OUTPATIENT
Start: 2022-06-15 | End: 2022-06-17 | Stop reason: HOSPADM

## 2022-06-15 RX ORDER — NALOXONE HYDROCHLORIDE 0.4 MG/ML
0.2 INJECTION, SOLUTION INTRAMUSCULAR; INTRAVENOUS; SUBCUTANEOUS
Status: DISCONTINUED | OUTPATIENT
Start: 2022-06-15 | End: 2022-06-17 | Stop reason: HOSPADM

## 2022-06-15 RX ORDER — CEFAZOLIN SODIUM/WATER 2 G/20 ML
2 SYRINGE (ML) INTRAVENOUS
Status: COMPLETED | OUTPATIENT
Start: 2022-06-15 | End: 2022-06-15

## 2022-06-15 RX ORDER — ONDANSETRON 2 MG/ML
4 INJECTION INTRAMUSCULAR; INTRAVENOUS EVERY 30 MIN PRN
Status: DISCONTINUED | OUTPATIENT
Start: 2022-06-15 | End: 2022-06-15 | Stop reason: HOSPADM

## 2022-06-15 RX ORDER — MAGNESIUM HYDROXIDE 1200 MG/15ML
LIQUID ORAL PRN
Status: DISCONTINUED | OUTPATIENT
Start: 2022-06-15 | End: 2022-06-15 | Stop reason: HOSPADM

## 2022-06-15 RX ORDER — HEPARIN SODIUM 5000 [USP'U]/.5ML
5000 INJECTION, SOLUTION INTRAVENOUS; SUBCUTANEOUS
Status: COMPLETED | OUTPATIENT
Start: 2022-06-15 | End: 2022-06-15

## 2022-06-15 RX ORDER — SODIUM CHLORIDE, SODIUM LACTATE, POTASSIUM CHLORIDE, CALCIUM CHLORIDE 600; 310; 30; 20 MG/100ML; MG/100ML; MG/100ML; MG/100ML
INJECTION, SOLUTION INTRAVENOUS CONTINUOUS
Status: DISCONTINUED | OUTPATIENT
Start: 2022-06-15 | End: 2022-06-15 | Stop reason: HOSPADM

## 2022-06-15 RX ORDER — HYDROMORPHONE HCL IN WATER/PF 6 MG/30 ML
0.2 PATIENT CONTROLLED ANALGESIA SYRINGE INTRAVENOUS
Status: DISCONTINUED | OUTPATIENT
Start: 2022-06-15 | End: 2022-06-17 | Stop reason: HOSPADM

## 2022-06-15 RX ORDER — NALOXONE HYDROCHLORIDE 0.4 MG/ML
0.4 INJECTION, SOLUTION INTRAMUSCULAR; INTRAVENOUS; SUBCUTANEOUS
Status: DISCONTINUED | OUTPATIENT
Start: 2022-06-15 | End: 2022-06-17 | Stop reason: HOSPADM

## 2022-06-15 RX ORDER — CALCIUM CARBONATE 500 MG/1
500 TABLET, CHEWABLE ORAL 4 TIMES DAILY PRN
Status: DISCONTINUED | OUTPATIENT
Start: 2022-06-15 | End: 2022-06-17 | Stop reason: HOSPADM

## 2022-06-15 RX ORDER — CARBOXYMETHYLCELLULOSE SODIUM 5 MG/ML
1 SOLUTION/ DROPS OPHTHALMIC 2 TIMES DAILY
Status: DISCONTINUED | OUTPATIENT
Start: 2022-06-15 | End: 2022-06-17 | Stop reason: HOSPADM

## 2022-06-15 RX ORDER — AMOXICILLIN 250 MG
1 CAPSULE ORAL 2 TIMES DAILY
Status: DISCONTINUED | OUTPATIENT
Start: 2022-06-15 | End: 2022-06-17 | Stop reason: HOSPADM

## 2022-06-15 RX ORDER — CIPROFLOXACIN 500 MG/1
500 TABLET, FILM COATED ORAL 2 TIMES DAILY
Qty: 6 TABLET | Refills: 0 | Status: SHIPPED | OUTPATIENT
Start: 2022-06-15 | End: 2022-06-18

## 2022-06-15 RX ORDER — TOLTERODINE 2 MG/1
4 CAPSULE, EXTENDED RELEASE ORAL DAILY
Status: DISCONTINUED | OUTPATIENT
Start: 2022-06-15 | End: 2022-06-17 | Stop reason: HOSPADM

## 2022-06-15 RX ORDER — LIDOCAINE 40 MG/G
CREAM TOPICAL
Status: DISCONTINUED | OUTPATIENT
Start: 2022-06-15 | End: 2022-06-17 | Stop reason: HOSPADM

## 2022-06-15 RX ORDER — SODIUM CHLORIDE, SODIUM LACTATE, POTASSIUM CHLORIDE, CALCIUM CHLORIDE 600; 310; 30; 20 MG/100ML; MG/100ML; MG/100ML; MG/100ML
INJECTION, SOLUTION INTRAVENOUS CONTINUOUS PRN
Status: DISCONTINUED | OUTPATIENT
Start: 2022-06-15 | End: 2022-06-15

## 2022-06-15 RX ORDER — ACETAMINOPHEN 325 MG/1
975 TABLET ORAL EVERY 6 HOURS SCHEDULED
Status: DISCONTINUED | OUTPATIENT
Start: 2022-06-15 | End: 2022-06-17 | Stop reason: HOSPADM

## 2022-06-15 RX ORDER — ASPIRIN 81 MG
100 TABLET, DELAYED RELEASE (ENTERIC COATED) ORAL DAILY
Qty: 30 TABLET | Refills: 0 | Status: SHIPPED | OUTPATIENT
Start: 2022-06-15 | End: 2022-07-15

## 2022-06-15 RX ORDER — HYDROMORPHONE HCL IN WATER/PF 6 MG/30 ML
0.4 PATIENT CONTROLLED ANALGESIA SYRINGE INTRAVENOUS EVERY 5 MIN PRN
Status: DISCONTINUED | OUTPATIENT
Start: 2022-06-15 | End: 2022-06-15 | Stop reason: HOSPADM

## 2022-06-15 RX ORDER — FENTANYL CITRATE 0.05 MG/ML
50 INJECTION, SOLUTION INTRAMUSCULAR; INTRAVENOUS EVERY 5 MIN PRN
Status: DISCONTINUED | OUTPATIENT
Start: 2022-06-15 | End: 2022-06-15 | Stop reason: HOSPADM

## 2022-06-15 RX ORDER — LABETALOL HYDROCHLORIDE 5 MG/ML
10 INJECTION, SOLUTION INTRAVENOUS
Status: DISCONTINUED | OUTPATIENT
Start: 2022-06-15 | End: 2022-06-15 | Stop reason: HOSPADM

## 2022-06-15 RX ORDER — OXYCODONE HYDROCHLORIDE 5 MG/1
5 TABLET ORAL EVERY 4 HOURS PRN
Status: DISCONTINUED | OUTPATIENT
Start: 2022-06-15 | End: 2022-06-17 | Stop reason: HOSPADM

## 2022-06-15 RX ORDER — HYDROMORPHONE HCL IN WATER/PF 6 MG/30 ML
0.4 PATIENT CONTROLLED ANALGESIA SYRINGE INTRAVENOUS
Status: DISCONTINUED | OUTPATIENT
Start: 2022-06-15 | End: 2022-06-17 | Stop reason: HOSPADM

## 2022-06-15 RX ORDER — HYDROMORPHONE HYDROCHLORIDE 1 MG/ML
INJECTION, SOLUTION INTRAMUSCULAR; INTRAVENOUS; SUBCUTANEOUS PRN
Status: DISCONTINUED | OUTPATIENT
Start: 2022-06-15 | End: 2022-06-15

## 2022-06-15 RX ORDER — LIDOCAINE HYDROCHLORIDE 20 MG/ML
INJECTION, SOLUTION INFILTRATION; PERINEURAL PRN
Status: DISCONTINUED | OUTPATIENT
Start: 2022-06-15 | End: 2022-06-15

## 2022-06-15 RX ORDER — PROPOFOL 10 MG/ML
INJECTION, EMULSION INTRAVENOUS PRN
Status: DISCONTINUED | OUTPATIENT
Start: 2022-06-15 | End: 2022-06-15

## 2022-06-15 RX ORDER — PROPOFOL 10 MG/ML
INJECTION, EMULSION INTRAVENOUS CONTINUOUS PRN
Status: DISCONTINUED | OUTPATIENT
Start: 2022-06-15 | End: 2022-06-15

## 2022-06-15 RX ORDER — ONDANSETRON 4 MG/1
4 TABLET, ORALLY DISINTEGRATING ORAL EVERY 6 HOURS PRN
Status: DISCONTINUED | OUTPATIENT
Start: 2022-06-15 | End: 2022-06-17 | Stop reason: HOSPADM

## 2022-06-15 RX ORDER — BUPIVACAINE HYDROCHLORIDE 2.5 MG/ML
INJECTION, SOLUTION INFILTRATION; PERINEURAL PRN
Status: DISCONTINUED | OUTPATIENT
Start: 2022-06-15 | End: 2022-06-15 | Stop reason: HOSPADM

## 2022-06-15 RX ORDER — DEXAMETHASONE SODIUM PHOSPHATE 4 MG/ML
INJECTION, SOLUTION INTRA-ARTICULAR; INTRALESIONAL; INTRAMUSCULAR; INTRAVENOUS; SOFT TISSUE PRN
Status: DISCONTINUED | OUTPATIENT
Start: 2022-06-15 | End: 2022-06-15

## 2022-06-15 RX ORDER — OXYBUTYNIN CHLORIDE 5 MG/1
5 TABLET, EXTENDED RELEASE ORAL DAILY
Qty: 14 TABLET | Refills: 0 | Status: SHIPPED | OUTPATIENT
Start: 2022-06-15 | End: 2022-06-29

## 2022-06-15 RX ORDER — EPHEDRINE SULFATE 50 MG/ML
INJECTION, SOLUTION INTRAMUSCULAR; INTRAVENOUS; SUBCUTANEOUS PRN
Status: DISCONTINUED | OUTPATIENT
Start: 2022-06-15 | End: 2022-06-15

## 2022-06-15 RX ORDER — CEFAZOLIN SODIUM/WATER 2 G/20 ML
2 SYRINGE (ML) INTRAVENOUS SEE ADMIN INSTRUCTIONS
Status: DISCONTINUED | OUTPATIENT
Start: 2022-06-15 | End: 2022-06-15 | Stop reason: HOSPADM

## 2022-06-15 RX ORDER — ONDANSETRON 2 MG/ML
INJECTION INTRAMUSCULAR; INTRAVENOUS PRN
Status: DISCONTINUED | OUTPATIENT
Start: 2022-06-15 | End: 2022-06-15

## 2022-06-15 RX ORDER — FENTANYL CITRATE 50 UG/ML
INJECTION, SOLUTION INTRAMUSCULAR; INTRAVENOUS PRN
Status: DISCONTINUED | OUTPATIENT
Start: 2022-06-15 | End: 2022-06-15

## 2022-06-15 RX ORDER — OXYCODONE HYDROCHLORIDE 5 MG/1
10 TABLET ORAL EVERY 4 HOURS PRN
Status: DISCONTINUED | OUTPATIENT
Start: 2022-06-15 | End: 2022-06-15 | Stop reason: HOSPADM

## 2022-06-15 RX ORDER — ONDANSETRON 2 MG/ML
4 INJECTION INTRAMUSCULAR; INTRAVENOUS EVERY 6 HOURS PRN
Status: DISCONTINUED | OUTPATIENT
Start: 2022-06-15 | End: 2022-06-17 | Stop reason: HOSPADM

## 2022-06-15 RX ORDER — SODIUM CHLORIDE, SODIUM LACTATE, POTASSIUM CHLORIDE, CALCIUM CHLORIDE 600; 310; 30; 20 MG/100ML; MG/100ML; MG/100ML; MG/100ML
INJECTION, SOLUTION INTRAVENOUS CONTINUOUS
Status: DISCONTINUED | OUTPATIENT
Start: 2022-06-15 | End: 2022-06-17 | Stop reason: HOSPADM

## 2022-06-15 RX ORDER — OXYCODONE HYDROCHLORIDE 5 MG/1
5 TABLET ORAL EVERY 6 HOURS PRN
Qty: 9 TABLET | Refills: 0 | Status: SHIPPED | OUTPATIENT
Start: 2022-06-15 | End: 2022-06-18

## 2022-06-15 RX ADMIN — ROCURONIUM BROMIDE 20 MG: 50 INJECTION, SOLUTION INTRAVENOUS at 09:16

## 2022-06-15 RX ADMIN — HYDROMORPHONE HYDROCHLORIDE 0.4 MG: 0.2 INJECTION, SOLUTION INTRAMUSCULAR; INTRAVENOUS; SUBCUTANEOUS at 15:39

## 2022-06-15 RX ADMIN — HYDROMORPHONE HYDROCHLORIDE 0.5 MG: 1 INJECTION, SOLUTION INTRAMUSCULAR; INTRAVENOUS; SUBCUTANEOUS at 09:25

## 2022-06-15 RX ADMIN — ROCURONIUM BROMIDE 10 MG: 50 INJECTION, SOLUTION INTRAVENOUS at 08:16

## 2022-06-15 RX ADMIN — DEXAMETHASONE SODIUM PHOSPHATE 4 MG: 4 INJECTION, SOLUTION INTRA-ARTICULAR; INTRALESIONAL; INTRAMUSCULAR; INTRAVENOUS; SOFT TISSUE at 08:12

## 2022-06-15 RX ADMIN — HYDROMORPHONE HYDROCHLORIDE 0.5 MG: 1 INJECTION, SOLUTION INTRAMUSCULAR; INTRAVENOUS; SUBCUTANEOUS at 08:32

## 2022-06-15 RX ADMIN — SODIUM CHLORIDE, POTASSIUM CHLORIDE, SODIUM LACTATE AND CALCIUM CHLORIDE: 600; 310; 30; 20 INJECTION, SOLUTION INTRAVENOUS at 16:41

## 2022-06-15 RX ADMIN — ROCURONIUM BROMIDE 10 MG: 50 INJECTION, SOLUTION INTRAVENOUS at 10:16

## 2022-06-15 RX ADMIN — ACETAMINOPHEN 975 MG: 325 TABLET ORAL at 23:32

## 2022-06-15 RX ADMIN — FENTANYL CITRATE 50 MCG: 50 INJECTION, SOLUTION INTRAMUSCULAR; INTRAVENOUS at 09:45

## 2022-06-15 RX ADMIN — SODIUM CHLORIDE, POTASSIUM CHLORIDE, SODIUM LACTATE AND CALCIUM CHLORIDE: 600; 310; 30; 20 INJECTION, SOLUTION INTRAVENOUS at 07:40

## 2022-06-15 RX ADMIN — ONDANSETRON 4 MG: 2 INJECTION INTRAMUSCULAR; INTRAVENOUS at 10:31

## 2022-06-15 RX ADMIN — Medication 1 DROP: at 21:35

## 2022-06-15 RX ADMIN — SENNOSIDES AND DOCUSATE SODIUM 1 TABLET: 50; 8.6 TABLET ORAL at 21:35

## 2022-06-15 RX ADMIN — FENTANYL CITRATE 100 MCG: 50 INJECTION, SOLUTION INTRAMUSCULAR; INTRAVENOUS at 07:45

## 2022-06-15 RX ADMIN — LIDOCAINE HYDROCHLORIDE 80 MG: 20 INJECTION, SOLUTION INFILTRATION; PERINEURAL at 07:45

## 2022-06-15 RX ADMIN — HEPARIN SODIUM 5000 UNITS: 5000 INJECTION, SOLUTION INTRAVENOUS; SUBCUTANEOUS at 07:08

## 2022-06-15 RX ADMIN — HYDROMORPHONE HYDROCHLORIDE 0.2 MG: 0.2 INJECTION, SOLUTION INTRAMUSCULAR; INTRAVENOUS; SUBCUTANEOUS at 16:38

## 2022-06-15 RX ADMIN — MIDAZOLAM 1 MG: 1 INJECTION INTRAMUSCULAR; INTRAVENOUS at 07:40

## 2022-06-15 RX ADMIN — FLUOXETINE 40 MG: 20 CAPSULE ORAL at 21:35

## 2022-06-15 RX ADMIN — SODIUM CHLORIDE, SODIUM LACTATE, POTASSIUM CHLORIDE, CALCIUM CHLORIDE: 600; 310; 30; 20 INJECTION, SOLUTION INTRAVENOUS at 07:47

## 2022-06-15 RX ADMIN — PHENYLEPHRINE HYDROCHLORIDE 100 MCG: 10 INJECTION INTRAVENOUS at 08:09

## 2022-06-15 RX ADMIN — HYDROMORPHONE HYDROCHLORIDE 0.2 MG: 0.2 INJECTION, SOLUTION INTRAMUSCULAR; INTRAVENOUS; SUBCUTANEOUS at 20:00

## 2022-06-15 RX ADMIN — SODIUM CHLORIDE 6000 ML: 900 IRRIGANT IRRIGATION at 17:35

## 2022-06-15 RX ADMIN — Medication 2 G: at 07:40

## 2022-06-15 RX ADMIN — ACETAMINOPHEN 975 MG: 325 TABLET ORAL at 16:39

## 2022-06-15 RX ADMIN — FENTANYL CITRATE 50 MCG: 50 INJECTION, SOLUTION INTRAMUSCULAR; INTRAVENOUS at 08:16

## 2022-06-15 RX ADMIN — OXYCODONE HYDROCHLORIDE 5 MG: 5 TABLET ORAL at 21:35

## 2022-06-15 RX ADMIN — ROCURONIUM BROMIDE 50 MG: 50 INJECTION, SOLUTION INTRAVENOUS at 07:46

## 2022-06-15 RX ADMIN — CLONAZEPAM 1 MG: 1 TABLET ORAL at 21:35

## 2022-06-15 RX ADMIN — SUGAMMADEX 200 MG: 100 INJECTION, SOLUTION INTRAVENOUS at 11:03

## 2022-06-15 RX ADMIN — HYDROMORPHONE HYDROCHLORIDE 0.2 MG: 0.2 INJECTION, SOLUTION INTRAMUSCULAR; INTRAVENOUS; SUBCUTANEOUS at 19:51

## 2022-06-15 RX ADMIN — PROPOFOL 200 MG: 10 INJECTION, EMULSION INTRAVENOUS at 07:45

## 2022-06-15 RX ADMIN — HYDROMORPHONE HYDROCHLORIDE 0.4 MG: 0.2 INJECTION, SOLUTION INTRAMUSCULAR; INTRAVENOUS; SUBCUTANEOUS at 14:03

## 2022-06-15 RX ADMIN — TOLTERODINE TARTRATE 4 MG: 2 CAPSULE, EXTENDED RELEASE ORAL at 16:39

## 2022-06-15 RX ADMIN — PROPOFOL 30 MCG/KG/MIN: 10 INJECTION, EMULSION INTRAVENOUS at 07:54

## 2022-06-15 RX ADMIN — Medication 10 MG: at 08:06

## 2022-06-15 RX ADMIN — SODIUM CHLORIDE, POTASSIUM CHLORIDE, SODIUM LACTATE AND CALCIUM CHLORIDE: 600; 310; 30; 20 INJECTION, SOLUTION INTRAVENOUS at 07:10

## 2022-06-15 RX ADMIN — FENTANYL CITRATE 50 MCG: 50 INJECTION, SOLUTION INTRAMUSCULAR; INTRAVENOUS at 12:52

## 2022-06-15 RX ADMIN — HYDROMORPHONE HYDROCHLORIDE 0.4 MG: 0.2 INJECTION, SOLUTION INTRAMUSCULAR; INTRAVENOUS; SUBCUTANEOUS at 13:21

## 2022-06-15 ASSESSMENT — ACTIVITIES OF DAILY LIVING (ADL)
ADLS_ACUITY_SCORE: 21

## 2022-06-15 ASSESSMENT — ENCOUNTER SYMPTOMS
SEIZURES: 0
DYSRHYTHMIAS: 0

## 2022-06-15 NOTE — PLAN OF CARE
Goal Outcome Evaluation:    Plan of Care Reviewed With: patient   Pt to floor, oriented to room and call lights, pt alert and oriented, complains of pain, IV pain medications given with good results, CBI flowing at a fast/moderate rate, output is watermelon color, no clots noted, bloody drainage noted at insertion site, ROBINSON drain with moderate output charted, pt tolerates clear liquid diet, lungs clear, O2 sats mid to high 90's on 1L NC, will continue to monitor

## 2022-06-15 NOTE — ANESTHESIA PREPROCEDURE EVALUATION
Anesthesia Pre-Procedure Evaluation    Patient: Boris Zambrano   MRN: [id number] : 1950        Procedure : Procedure(s):  ROBOTIC ASSISTED LAPAROSCOPIC SIMPLE PROSTATECTOMY - POSSIBLE OPEN          Past Medical History:   Diagnosis Date     BPH with elevated PSA      Calculus of kidney      Cataract of both eyes      De Quervain's disease (tenosynovitis)      Gynecomastia      Hematuria      Hydronephrosis     right     Hypertension      Meniscus, medial, derangement      OCD (obsessive compulsive disorder)      Osteoarthritis      Sciatica, unspecified laterality       Past Surgical History:   Procedure Laterality Date     ARTHROSCOPY KNEE Left     medial meniscus     BIOPSY OF PROSTATE,NEEDLE/PUNCH       CATARACT IOL, RT/LT Bilateral      COLONOSCOPY       CYSTOSCOPY      with right ureteral stent     EMBOLIZATION Right     kidney     PERCUTANEOUS NEPHROLITHOTOMY      with cysto and right stent      No Known Allergies   Social History     Tobacco Use     Smoking status: Never Smoker     Smokeless tobacco: Never Used   Substance Use Topics     Alcohol use: Not on file      Wt Readings from Last 1 Encounters:   20 84.8 kg (187 lb)        Anesthesia Evaluation   Pt has had prior anesthetic.     No history of anesthetic complications       ROS/MED HX  ENT/Pulmonary:    (-) asthma and sleep apnea   Neurologic:    (-) no seizures, no CVA and migraines   Cardiovascular:     (+) Dyslipidemia hypertension----- (-) CHF, arrhythmias and irregular heartbeat/palpitations   METS/Exercise Tolerance:     Hematologic:       Musculoskeletal:   (+) arthritis,     GI/Hepatic:    (-) GERD and liver disease   Renal/Genitourinary: Comment: Elevated PSA    (+) renal disease, type: CRI, Nephrolithiasis , BPH,     Endo:     (+) Obesity,  (-) Type II DM and thyroid disease   Psychiatric/Substance Use:     (+) psychiatric history     Infectious Disease:       Malignancy:       Other:            Physical Exam    Airway         Mallampati: II   TM distance: > 3 FB   Neck ROM: full   Mouth opening: > 3 cm    Respiratory Devices and Support         Dental  no notable dental history   Comment: crowns        Cardiovascular   cardiovascular exam normal          Pulmonary   pulmonary exam normal        breath sounds clear to auscultation           OUTSIDE LABS:  CBC: No results found for: WBC, HGB, HCT, PLT  BMP:   Lab Results   Component Value Date    CR 1.3 03/01/2022     COAGS: No results found for: PTT, INR, FIBR  POC: No results found for: BGM, HCG, HCGS  HEPATIC: No results found for: ALBUMIN, PROTTOTAL, ALT, AST, GGT, ALKPHOS, BILITOTAL, BILIDIRECT, DISHA  OTHER: No results found for: PH, LACT, A1C, SAMUEL, PHOS, MAG, LIPASE, AMYLASE, TSH, T4, T3, CRP, SED    Anesthesia Plan    ASA Status:  2   NPO Status:  NPO Appropriate    Anesthesia Type: General.     - Airway: ETT   Induction: Intravenous.   Maintenance: Balanced.   Techniques and Equipment:     - Lines/Monitors: 2nd IV     - Blood: T&S     - Drips/Meds: Phenylephrine     Consents    Anesthesia Plan(s) and associated risks, benefits, and realistic alternatives discussed. Questions answered and patient/representative(s) expressed understanding.    - Discussed:     - Discussed with:  Patient      - Extended Intubation/Ventilatory Support Discussed: No.      - Patient is DNR/DNI Status: No    Use of blood products discussed: Yes.     - Discussed with: Patient.     - Consented: consented to blood products            Reason for refusal: other.     Postoperative Care    Pain management: IV analgesics, Oral pain medications, Multi-modal analgesia.   PONV prophylaxis: Ondansetron (or other 5HT-3), Dexamethasone or Solumedrol, Background Propofol Infusion     Comments:                Shaggy Madison DO

## 2022-06-15 NOTE — ANESTHESIA CARE TRANSFER NOTE
Patient: Boris Zambrano    Procedure: Procedure(s):  ROBOTIC ASSISTED LAPAROSCOPIC SIMPLE PROSTATECTOMY       Diagnosis: BPH with urinary obstruction [N40.1, N13.8]  Diagnosis Additional Information: No value filed.    Anesthesia Type:   General     Note:    Oropharynx: oropharynx clear of all foreign objects and spontaneously breathing  Level of Consciousness: drowsy  Oxygen Supplementation: face mask  Level of Supplemental Oxygen (L/min / FiO2): 10  Independent Airway: airway patency satisfactory and stable  Dentition: dentition unchanged  Vital Signs Stable: post-procedure vital signs reviewed and stable  Report to RN Given: handoff report given  Patient transferred to: PACU    Handoff Report: Identifed the Patient, Identified the Reponsible Provider, Reviewed the pertinent medical history, Discussed the surgical course, Reviewed Intra-OP anesthesia mangement and issues during anesthesia, Set expectations for post-procedure period and Allowed opportunity for questions and acknowledgement of understanding      Vitals:  Vitals Value Taken Time   /90    Temp     Pulse 92 06/15/22 1125   Resp 12 06/15/22 1125   SpO2 96 % 06/15/22 1125   Vitals shown include unvalidated device data.    Electronically Signed By: MINDA Teague CRNA  Lucia 15, 2022  11:26 AM

## 2022-06-15 NOTE — TELEPHONE ENCOUNTER
Left generic voicemail for patient to return call to clinic. When call is returned patient can be assisted in moving his 6/30 appointment to 6/28 at 10am      Jessica Corral LPN on 6/15/2022 at 11:12 AM

## 2022-06-15 NOTE — ANESTHESIA POSTPROCEDURE EVALUATION
Patient: Boris Zambrano    Procedure: Procedure(s):  ROBOTIC ASSISTED LAPAROSCOPIC SIMPLE PROSTATECTOMY       Anesthesia Type:  General    Note:  Disposition: Inpatient   Postop Pain Control: Uneventful            Sign Out: Well controlled pain   PONV: No   Neuro/Psych: Uneventful            Sign Out: Acceptable/Baseline neuro status   Airway/Respiratory: Uneventful            Sign Out: Acceptable/Baseline resp. status   CV/Hemodynamics: Uneventful            Sign Out: Acceptable CV status; No obvious hypovolemia; No obvious fluid overload   Other NRE: NONE   DID A NON-ROUTINE EVENT OCCUR? No           Last vitals:  Vitals Value Taken Time   /77 06/15/22 1530   Temp 36.1  C (97  F) 06/15/22 1310   Pulse 96 06/15/22 1535   Resp 20 06/15/22 1554   SpO2 97 % 06/15/22 1556   Vitals shown include unvalidated device data.    Electronically Signed By: Shaggy Madison DO  Lucia 15, 2022  5:24 PM

## 2022-06-15 NOTE — PROGRESS NOTES
PTA medications updated by Medication Scribe prior to surgery via phone call with patient (last doses completed by Nurse)     Medication history sources: Patient and Surescripts  In the past week, patient estimated taking medication this percent of the time: Greater than 90%  Adherence assessment: N/A Not Observed    Significant changes made to the medication list:  None      Additional medication history information:   Patient was advised to bring: restasis    Medication reconciliation completed by provider prior to medication history? No    Time spent in this activity: 40 minutes    The information provided in this note is only as accurate as the sources available at the time of update(s)      Prior to Admission medications    Medication Sig Last Dose Taking? Auth Provider Long Term End Date   atorvastatin (LIPITOR) 10 MG tablet Take 10 mg by mouth daily  at PM Yes Reported, Patient Yes    clonazePAM (KLONOPIN) 1 MG tablet Take 1 tablet by mouth 2 times daily.  at PM Yes Marques Fisher MD     cycloSPORINE (RESTASIS) 0.05 % ophthalmic emulsion Place 1 drop into both eyes 2 times daily  at PM Yes Reported, Patient     FLUoxetine (PROZAC) 20 MG capsule Take 40 mg by mouth 2 times daily (20 MG X 2 = 40 MG)  at AM Yes Reported, Patient Yes    lisinopril (ZESTRIL) 10 MG tablet Take 1 tablet by mouth daily  at AM Yes Reported, Patient Yes    Misc Natural Products (PROSTATE SUPPORT) 300-15 MG TABS Take 1 tablet by mouth every evening  at PM Yes Reported, Patient     OVER-THE-COUNTER Take 2 tablets by mouth every morning Medication Name: Youthful Mind Tab  at AM Yes Reported, Patient     traMADol (ULTRAM) 50 MG tablet Take 50 mg by mouth 4 times daily as needed for pain  at PRN Yes Reported, Patient     tamsulosin (FLOMAX) 0.4 MG capsule Take 0.4 mg by mouth daily  at AM  Reported, Patient

## 2022-06-15 NOTE — DISCHARGE INSTRUCTIONS
POSTOPERATIVE INSTRUCTIONS    Diagnosis-------------------------------   BPH with obstruction    Procedure-------------------------------  Procedure(s) (LRB):  ROBOTIC ASSISTED LAPAROSCOPIC SIMPLE PROSTATECTOMY (N/A)      Findings--------------------------------  Significant prostatic adenoma with a very large protruding median lobe.  Prostate adenoma removed in entirety.  Bladder closed with watertight closure.    Home-going instructions-----------------         Activity Limitation:     - No driving or operating heavy machinery while on narcotic pain medication.   - No strenuous exercise for 6 weeks.   - No lifting, pushing, pulling more than 10 pounds for 6 weeks. Take care when pushing with your arms to stand up.   - Do not strain your belly area. When you bend, sit up or twice, you could strain the area around your incision.   - Do not strain with bowel movements.   - Do not drive until you can press the brake pedal quickly and fully without pain.   - Do not operate a motor vehicle while taking narcotic pain medications    FOLLOW THESE INSTRUCTIONS AS INDICATED BELOW:  - Observe operative area for signs of excessive bleeding.  - You may shower.  - Increase fluid intake to promote clear urine.  - Resume usual diet as tolerated    What to expect while recovering----------- TUBES AND DRAINS:  1) Dooley Catheter: You are going home with a Dooley catheter which will remain in place until your follow-up appointment. Your nurse or  will provide written catheter care instructions for you to take home.   - Protect the catheter and treat it like an extension of your body - keep it secured to your leg and do not let it get caught, snagged, or tugged.   - Should the catheter somehow come out of position, do not let anyone but a urologist who is aware of your recent surgery try to replace a catheter. Best yet, contact our urology office right away with any concerns.   - Apply vaseline twice daily to the tip of the  "penis/catheter insertion point to keep the area lubricated and more comfortable.    What to expect while recovering----------- WOUND CARE:  - You may shower and get incisions wet starting 48 hrs after surgery.  - Do not scrub incisions or submerge wounds (aka, bath, pool, hot tub, etc.) for 2 weeks or until wounds have healed and catheter is removed.   - Remove wound dressing 48 hours after surgery if already not removed.   - If purple dermabond glue was used, avoid applying any lotions or ointments.   - Leave incision open to air. Cover with gauze only if needed for comfort or to protect clothing from drainage.    Discharge Medications/instructions:   1) PAIN: Oxycodone is a narcotic medication that has been prescribed for pain. Narcotics will cause sleepiness and constipation, therefore it is best to stop or reduce them as soon as you can and switch to using acetaminophen (Tylenol) and/or ibuprofen (Advil/Motrin), taken as directed on the packaging. Keep in mind that certain narcotics can contain acetaminophen, also called \"APAP\" on prescription bottles. Do not take more than 4,000mg of Tylenol (acetaminophen/ APAP) from all sources in any 24 hour period since this can cause liver damage. Never drive, operate machinery or drink alcoholic beverages while you are taking narcotic pain medications.     2) CONSTIPATION: Pericolace (senna/docusate sodium) can be taken twice daily for prevention of constipation since surgery, pain medications and bladder spasm medications can all make you constipated. Please reduce or stop pericolace if you develop loose stools. Other over the counter solutions such as prune juice, miralax, fiber products, senna, and dulcolax can also be used. If you are taking the pericolace but still have not had a bowel movement in 3 days, start over-the-counter Milk of Magnesia taken twice daily until you have a nice bowel movement. Call the office with any concerns.     3) ANTIBIOTICS - " Ciprofloxacin 500mg should be taken started the day prior to your your followup appointment to remove your Dooley catheter and continued for 3 days.    4) Ditropan: Take daily to decrease catheter discomfort. Stop taking one day before your urology clinic visit      Questions/concerns------------------------  [facility]: [phone]  [facility]: [phone]    Future appointments  You will be contacted to move up your follow up to 6/28/22.      Angel Shaffer MD

## 2022-06-15 NOTE — TELEPHONE ENCOUNTER
----- Message from Angel Shaffer MD sent at 6/15/2022 11:04 AM CDT -----  Regarding: Move up follow up to 6/28  Hi team,    I'd like Boris to come in on 6/28 instead of 6/30 for his chapman removal.    Thanks,   Angel Shaffer M.D.

## 2022-06-15 NOTE — INTERVAL H&P NOTE
"I have reviewed the surgical (or preoperative) H&P that is linked to this encounter, and examined the patient. There are no significant changes    Clinical Conditions Present on Arrival:  Clinically Significant Risk Factors Present on Admission                   # Overweight: Estimated body mass index is 29.05 kg/m  as calculated from the following:    Height as of this encounter: 1.702 m (5' 7\").    Weight as of this encounter: 84.1 kg (185 lb 8 oz).       "

## 2022-06-15 NOTE — OP NOTE
OPERATIVE REPORT  DATE OF SURGERY: 06/15/22  LOCATION OF SURGERY: [facility] OR  PREOPERATIVE DIAGNOSIS:  (N40.1,  N13.8) BPH with urinary obstruction  (primary encounter diagnosis)  POSTOPERATIVE DIAGNOSIS: (N40.1,  N13.8) BPH with urinary obstruction  (primary encounter diagnosis)    START TIME: 8:18 AM  END TIME: 10:55 AM    PROCEDURE PERFORMED:   1. ROBOTIC ASSISTED LAPAROSCOPIC SIMPLE PROSTATECTOMY      STAFF SURGEON: Angel Shaffer MD  RESIDENT SURGEON: Pamela López MD  ASSISTANT: Leticia Conti PA-C  ANESTHESIA: General.   ESTIMATED BLOOD LOSS: 50 mL.   DRAINS AND TUBES: 20fr 3-way catheter with 25cc in the balloon, 19fr Fredy drain   COMPLICATIONS: None.   DISPOSITION: PACU.   SPECIMENS OBTAINED:   ID Type Source Tests Collected by Time Destination   1 : PROSTATE ADENOMA Tissue Prostate SURGICAL PATHOLOGY EXAM Angel Shaffer MD 6/15/2022 10:38 AM      SIGNIFICANT FINDINGS: Significant prostatic adenoma with a very large protruding median lobe.  Prostate adenoma removed in entirety.  Bladder closed with watertight closure.     HISTORY OF PRESENT ILLNESS: Boris Zambrano is a 72 year old man with significant BPH and LUTS with an MRI showing 105 g prostate.  He was counseled on treatment options and elects to proceed with the above surgery.    OPERATION PERFORMED:   Informed consent was obtained and the patient was brought to the operating room where general anesthesia was induced. The patient was given appropriate preoperative antibiotics and positioned supine. We then performed a timeout, verifying the correct patient's site and procedure to be performed.    Port placement was performed in the usual fashion includin mm supraumbilical camera port via Veress needle; Two left lateral robotic ports 8mm under direct vision; One right lateral robotic port under direct vision; 12mm right lateral assist port under direct vision.  The robot was then docked. Sigmoid adhesions were taken down from the left pelvic  side wall and inguinal area.  The bladder was filled with 300 to 500 cc of saline.  The inverted U incision was made at the dome of the bladder.  The bladder was opened and drained.  He was noted to have a very large protruding median lobe as evidenced on his preoperative MRI.  This was grasped with a robotic tenaculum and lifted anteriorly.  Bilateral ureteral orifices were easily identified.  Incision was initiated at the bladder neck on the posterior aspect of the median lobe. This was about 1-2 cm up the median lobe to preserve bladder neck mucosa for bladder neck advancement.  Incision was carried down to the surgical capsule where the adenoma was then enucleated with combination of blunt and electrocautery dissection.  The adenoma was dissected in entirety down to the level of the apex which was transected and the adenoma was removed.  Hemostasis was ensured and noted to be excellent and Floseal was applied bilaterally. Bladder neck advancement was completed with 3-0 V-Lock suture running from the 6 o'clock position up to the 9 o'clock and 3 o'clock positions respectively.  Ultimately, this advancement suture came down well.  A new 20 Malay three-way catheter was advanced into the bladder with 30 cc in the balloon.  The cystorrhaphy was then closed in 2 layers with 3-0 V-Lock and 2-0 V-Lock sutures.  Bladder was leak tested noted to be watertight.      ROBINSON was placed in the left lateral pelvic gutter and brought out through the lateral port site. The assistant port was closed with a Eligio-López device. The remaining ports were removed under direct vision with no bleeding noted from the abdominal wall, and the abdomen was desufflated. The midline camera port site was extended and the specimen was extracted. The fascia was then closed with 0 PDS. All port sites were irrigated with normal saline. 30 cc marcaine was injected for incisional analgesia. The skin was closed with 4-0 monocyl in a running  subcuticular fashion and skin glue was applied.      At the completion of the procedure, all surgical counts were correct.       The patient tolerated the procedure well, and he was awakened from anesthesia, extubated and transferred to the PACU in stable condition.     I performed the entire procedure with assistance.  NATASHA Ochoa assisted with patient positioning, port placement, robot docking, bedside laparoscopic assistance, and her participation was vital to the completion of the case.    Angel Shaffer MD   Urology  [facility]  Clinic Phone [phone]

## 2022-06-16 LAB
ANION GAP SERPL CALCULATED.3IONS-SCNC: 4 MMOL/L (ref 3–14)
BUN SERPL-MCNC: 14 MG/DL (ref 7–30)
CALCIUM SERPL-MCNC: 8.6 MG/DL (ref 8.5–10.1)
CHLORIDE BLD-SCNC: 106 MMOL/L (ref 94–109)
CO2 SERPL-SCNC: 27 MMOL/L (ref 20–32)
CREAT SERPL-MCNC: 1.25 MG/DL (ref 0.66–1.25)
ERYTHROCYTE [DISTWIDTH] IN BLOOD BY AUTOMATED COUNT: 13.3 % (ref 10–15)
GFR SERPL CREATININE-BSD FRML MDRD: 62 ML/MIN/1.73M2
GLUCOSE BLD-MCNC: 117 MG/DL (ref 70–99)
HCT VFR BLD AUTO: 37 % (ref 40–53)
HGB BLD-MCNC: 12.1 G/DL (ref 13.3–17.7)
MCH RBC QN AUTO: 29.4 PG (ref 26.5–33)
MCHC RBC AUTO-ENTMCNC: 32.7 G/DL (ref 31.5–36.5)
MCV RBC AUTO: 90 FL (ref 78–100)
PLATELET # BLD AUTO: 203 10E3/UL (ref 150–450)
POTASSIUM BLD-SCNC: 4 MMOL/L (ref 3.4–5.3)
RBC # BLD AUTO: 4.11 10E6/UL (ref 4.4–5.9)
SODIUM SERPL-SCNC: 137 MMOL/L (ref 133–144)
WBC # BLD AUTO: 14.9 10E3/UL (ref 4–11)

## 2022-06-16 PROCEDURE — 250N000013 HC RX MED GY IP 250 OP 250 PS 637: Performed by: STUDENT IN AN ORGANIZED HEALTH CARE EDUCATION/TRAINING PROGRAM

## 2022-06-16 PROCEDURE — 80048 BASIC METABOLIC PNL TOTAL CA: CPT | Performed by: STUDENT IN AN ORGANIZED HEALTH CARE EDUCATION/TRAINING PROGRAM

## 2022-06-16 PROCEDURE — 258N000003 HC RX IP 258 OP 636: Performed by: STUDENT IN AN ORGANIZED HEALTH CARE EDUCATION/TRAINING PROGRAM

## 2022-06-16 PROCEDURE — 120N000001 HC R&B MED SURG/OB

## 2022-06-16 PROCEDURE — 85027 COMPLETE CBC AUTOMATED: CPT | Performed by: STUDENT IN AN ORGANIZED HEALTH CARE EDUCATION/TRAINING PROGRAM

## 2022-06-16 PROCEDURE — 36415 COLL VENOUS BLD VENIPUNCTURE: CPT | Performed by: STUDENT IN AN ORGANIZED HEALTH CARE EDUCATION/TRAINING PROGRAM

## 2022-06-16 PROCEDURE — 258N000001 HC RX 258: Performed by: STUDENT IN AN ORGANIZED HEALTH CARE EDUCATION/TRAINING PROGRAM

## 2022-06-16 RX ADMIN — SENNOSIDES AND DOCUSATE SODIUM 1 TABLET: 50; 8.6 TABLET ORAL at 21:15

## 2022-06-16 RX ADMIN — ACETAMINOPHEN 975 MG: 325 TABLET ORAL at 17:46

## 2022-06-16 RX ADMIN — CLONAZEPAM 1 MG: 1 TABLET ORAL at 08:00

## 2022-06-16 RX ADMIN — SODIUM CHLORIDE 3000 ML: 900 IRRIGANT IRRIGATION at 07:34

## 2022-06-16 RX ADMIN — ACETAMINOPHEN 975 MG: 325 TABLET ORAL at 06:16

## 2022-06-16 RX ADMIN — SODIUM CHLORIDE: 900 IRRIGANT IRRIGATION at 12:09

## 2022-06-16 RX ADMIN — SODIUM CHLORIDE 3000 ML: 900 IRRIGANT IRRIGATION at 17:50

## 2022-06-16 RX ADMIN — OXYCODONE HYDROCHLORIDE 5 MG: 5 TABLET ORAL at 01:40

## 2022-06-16 RX ADMIN — SODIUM CHLORIDE: 900 IRRIGANT IRRIGATION at 10:16

## 2022-06-16 RX ADMIN — SODIUM CHLORIDE, POTASSIUM CHLORIDE, SODIUM LACTATE AND CALCIUM CHLORIDE: 600; 310; 30; 20 INJECTION, SOLUTION INTRAVENOUS at 02:14

## 2022-06-16 RX ADMIN — FLUOXETINE 40 MG: 20 CAPSULE ORAL at 07:58

## 2022-06-16 RX ADMIN — FLUOXETINE 40 MG: 20 CAPSULE ORAL at 21:15

## 2022-06-16 RX ADMIN — Medication 1 DROP: at 21:15

## 2022-06-16 RX ADMIN — SODIUM CHLORIDE: 900 IRRIGANT IRRIGATION at 02:09

## 2022-06-16 RX ADMIN — OXYCODONE HYDROCHLORIDE 5 MG: 5 TABLET ORAL at 06:16

## 2022-06-16 RX ADMIN — CLONAZEPAM 1 MG: 1 TABLET ORAL at 21:15

## 2022-06-16 RX ADMIN — ACETAMINOPHEN 975 MG: 325 TABLET ORAL at 13:37

## 2022-06-16 RX ADMIN — SENNOSIDES AND DOCUSATE SODIUM 1 TABLET: 50; 8.6 TABLET ORAL at 07:59

## 2022-06-16 RX ADMIN — OXYCODONE HYDROCHLORIDE 5 MG: 5 TABLET ORAL at 14:18

## 2022-06-16 RX ADMIN — TOLTERODINE TARTRATE 4 MG: 2 CAPSULE, EXTENDED RELEASE ORAL at 07:59

## 2022-06-16 ASSESSMENT — ACTIVITIES OF DAILY LIVING (ADL)
ADLS_ACUITY_SCORE: 23

## 2022-06-16 NOTE — PROGRESS NOTES
Urology    Minimal pain  Tolerating small PO intake  Passing flatus  hgb 12.1  Incisions C/D/I  Abd S/NT/ND    Earlier today I inspected the urine and it was clear with CBI  I turned off CBI for 1 hour, and urine became read again    A/P: Leave on CBI overnight  Possible discharge tomorrow

## 2022-06-16 NOTE — UTILIZATION REVIEW
[facility]   Admission Status; Secondary Review Determination   Admission date:  6/15/2022  5:40 AM    Under the authority of the Utilization Management Committee, the utilization review process indicated a secondary review on the above patient. The review outcome is based on review of the medical records, discussions with staff, and applying clinical experience noted on the date of the review.     (x) Inpatient Status Appropriate - This patient's medical care is consistent with medical management for inpatient care and reasonable inpatient medical practice.     RATIONALE FOR DETERMINATION   72-year-old male with a history of BPH and urinary obstruction underwent robotic assisted laparoscopic simple prostatectomy yesterday on 6/15.  He now has a catheter and is undergoing CBI.  Bladder irrigation was turned off and his urine became red again so he will remain on CBI overnight until his urine clears.  This patient is requiring ongoing cares which are requiring longer hospitalization than initially anticipated and warrants ongoing bladder irrigation with monitoring of hemoglobin and the degree of hematuria which makes his care appropriate for inpatient status.  This recommendation was paged to Dr. Bertin Edwards MD.  The severity of illness, intensity of service provided, expected LOS and risk for adverse outcome make the care complex, high risk and appropriate for hospital admission.    At the time of admission with the information available to the attending physician more than 2 nights Hospital complex care was anticipated, based on patient risk of adverse outcome if treated as outpatient and complex care required.  Inpatient admission is appropriate based on the Medicare guidelines.      The information on this document is developed by the utilization review team in order for the business office to ensure compliance. This only denotes the appropriateness of proper admission status and does not  reflect the quality of care rendered.   The definitions of Inpatient Status and Observation Status used in making the determination above are those provided in the CMS Coverage Manual, Chapter 1 and Chapter 6, section 70.4.     Sincerely,   Salvador Salas DO, MPH   Physician Advisor  Utilization Review  [facility]

## 2022-06-16 NOTE — TELEPHONE ENCOUNTER
Attempted to reach patient by phone, but no answer. Left message with request to return call to clinic. When patient returns call, 6/30/22 appointment can be moved to 6/28/22 at 10:00am.    Peg Rosenbaum RN, BSN

## 2022-06-16 NOTE — PLAN OF CARE
Goal Outcome Evaluation:    POD 1 prostatectomy. A&Ox4. VSS on RA. Pain managed w/ IV Dilaudid x1, transitioned to PO Oxy. Ice applied to incisions. Lap sites WNL. ROBINSON to LLQ, dressing CDI. CBI at moderate/slow rate, titrated as able. Few small clots noted. PIV infusing LR at 100 ml/hr. Tolerating diet, denied nausea. Passing minimal gas, educated on importance of activity. Up SBA, ambulated in uribe x2. Discharge pending.    Plan of Care Reviewed With: patient     Overall Patient Progress: improving

## 2022-06-16 NOTE — PLAN OF CARE
Date & Time:6/16/22  Orientation: AOx4  POD#2  Activity Level: Standby assist  Fall Risk: Yes  Behavior & Aggression: None  Pain Management: Tylenol, oxycodone  ABNL VS/O2: VSS  Diet: Regular  Bowel/Bladder: No BM  Drains/Devices: Kieran BOWERS  Anticipated  DC Date: Pending  Other Important Info: Pt AOx4, denies SOB, walked on the hallway several times. Has had no BM today but had flatus a couple of times. Ate 50% of his lunch. MD stopped CBI for an hour to monitor. Output was cherry red after being stopped and pt started having spasms and complained of pain. Oxycodone administered for pain.

## 2022-06-16 NOTE — PLAN OF CARE
Goal Outcome Evaluation:    Plan of Care Reviewed With: patient      Pt alert and oriented, vss, lungs clear, passing flatus, no BM noted, CBI running slow, hand irrigation done, some clots noted, pain well controled with oral pain medications, up with stand by assist in halls, tolerates diet, ROBINSON drain in place, dressing clean dry and intact,

## 2022-06-17 VITALS
OXYGEN SATURATION: 95 % | HEART RATE: 85 BPM | SYSTOLIC BLOOD PRESSURE: 154 MMHG | BODY MASS INDEX: 29.11 KG/M2 | WEIGHT: 185.5 LBS | TEMPERATURE: 97.7 F | DIASTOLIC BLOOD PRESSURE: 93 MMHG | HEIGHT: 67 IN | RESPIRATION RATE: 16 BRPM

## 2022-06-17 LAB
ANION GAP SERPL CALCULATED.3IONS-SCNC: 5 MMOL/L (ref 3–14)
BUN SERPL-MCNC: 13 MG/DL (ref 7–30)
CALCIUM SERPL-MCNC: 8.7 MG/DL (ref 8.5–10.1)
CHLORIDE BLD-SCNC: 105 MMOL/L (ref 94–109)
CO2 SERPL-SCNC: 28 MMOL/L (ref 20–32)
CREAT SERPL-MCNC: 0.99 MG/DL (ref 0.66–1.25)
ERYTHROCYTE [DISTWIDTH] IN BLOOD BY AUTOMATED COUNT: 13.2 % (ref 10–15)
GFR SERPL CREATININE-BSD FRML MDRD: 82 ML/MIN/1.73M2
GLUCOSE BLD-MCNC: 100 MG/DL (ref 70–99)
GLUCOSE BLDC GLUCOMTR-MCNC: 90 MG/DL (ref 70–99)
HCT VFR BLD AUTO: 33.9 % (ref 40–53)
HGB BLD-MCNC: 11.4 G/DL (ref 13.3–17.7)
MCH RBC QN AUTO: 29.2 PG (ref 26.5–33)
MCHC RBC AUTO-ENTMCNC: 33.6 G/DL (ref 31.5–36.5)
MCV RBC AUTO: 87 FL (ref 78–100)
PATH REPORT.COMMENTS IMP SPEC: NORMAL
PATH REPORT.COMMENTS IMP SPEC: NORMAL
PATH REPORT.FINAL DX SPEC: NORMAL
PATH REPORT.GROSS SPEC: NORMAL
PATH REPORT.MICROSCOPIC SPEC OTHER STN: NORMAL
PATH REPORT.RELEVANT HX SPEC: NORMAL
PHOTO IMAGE: NORMAL
PLATELET # BLD AUTO: 197 10E3/UL (ref 150–450)
POTASSIUM BLD-SCNC: 3.5 MMOL/L (ref 3.4–5.3)
RBC # BLD AUTO: 3.9 10E6/UL (ref 4.4–5.9)
SODIUM SERPL-SCNC: 138 MMOL/L (ref 133–144)
WBC # BLD AUTO: 12 10E3/UL (ref 4–11)

## 2022-06-17 PROCEDURE — 88344 IMHCHEM/IMCYTCHM EA MLT ANTB: CPT | Mod: 26 | Performed by: PATHOLOGY

## 2022-06-17 PROCEDURE — 250N000013 HC RX MED GY IP 250 OP 250 PS 637: Performed by: STUDENT IN AN ORGANIZED HEALTH CARE EDUCATION/TRAINING PROGRAM

## 2022-06-17 PROCEDURE — 85027 COMPLETE CBC AUTOMATED: CPT | Performed by: STUDENT IN AN ORGANIZED HEALTH CARE EDUCATION/TRAINING PROGRAM

## 2022-06-17 PROCEDURE — 80048 BASIC METABOLIC PNL TOTAL CA: CPT | Performed by: STUDENT IN AN ORGANIZED HEALTH CARE EDUCATION/TRAINING PROGRAM

## 2022-06-17 PROCEDURE — 88307 TISSUE EXAM BY PATHOLOGIST: CPT | Mod: 26 | Performed by: PATHOLOGY

## 2022-06-17 PROCEDURE — 36415 COLL VENOUS BLD VENIPUNCTURE: CPT | Performed by: STUDENT IN AN ORGANIZED HEALTH CARE EDUCATION/TRAINING PROGRAM

## 2022-06-17 RX ADMIN — ACETAMINOPHEN 975 MG: 325 TABLET ORAL at 00:26

## 2022-06-17 RX ADMIN — SENNOSIDES AND DOCUSATE SODIUM 1 TABLET: 50; 8.6 TABLET ORAL at 09:28

## 2022-06-17 RX ADMIN — FLUOXETINE 40 MG: 20 CAPSULE ORAL at 09:28

## 2022-06-17 RX ADMIN — Medication 1 DROP: at 09:37

## 2022-06-17 RX ADMIN — TOLTERODINE TARTRATE 4 MG: 2 CAPSULE, EXTENDED RELEASE ORAL at 09:28

## 2022-06-17 RX ADMIN — ACETAMINOPHEN 975 MG: 325 TABLET ORAL at 12:42

## 2022-06-17 RX ADMIN — CLONAZEPAM 1 MG: 1 TABLET ORAL at 09:28

## 2022-06-17 RX ADMIN — ACETAMINOPHEN 975 MG: 325 TABLET ORAL at 06:32

## 2022-06-17 ASSESSMENT — ACTIVITIES OF DAILY LIVING (ADL)
ADLS_ACUITY_SCORE: 23

## 2022-06-17 NOTE — PROGRESS NOTES
"Urology Daily Progress Note    24 hour events/Subjective:     - NAEO   - Required irrigation once overnight for small clots    O:  Vitals: /85 (BP Location: Right arm, Patient Position: Semi-Mccormick's, Cuff Size: Adult Regular)   Pulse 87   Temp 98.9  F (37.2  C) (Oral)   Resp 16   Ht 1.702 m (5' 7\")   Wt 84.1 kg (185 lb 8 oz)   SpO2 94%   BMI 29.05 kg/m     - Temp (24hrs), Av.9  F (37.2  C), Min:98.7  F (37.1  C), Max:99.1  F (37.3  C)      General: Alert, interactive, in NAD  Resp: Non-labored breathing on RA  Abdomen: Soft, appropriately tender  Ext: Warm and well perfused   Dooley/Urostomy: clear clamped   Drain: Serosanguinous    I/O  I/O last 3 completed shifts:  In: 2997 [P.O.:960; I.V.:]  Out: -1265 [Drains:80] - Last 24 hours    No intake/output data recorded. - Since Midnight    Labs      Heme:  Recent Labs   Lab 22  0605 06/15/22  1145   WBC 14.9* 13.7*   HGB 12.1* 13.0*    203     Chem:  Recent Labs   Lab 22  0605 06/15/22  1145 06/15/22  0618   POTASSIUM 4.0 3.6 3.4   CR 1.25 1.12  --      INRNo lab results found in last 7 days.     Assessment/Plan  72 year old y/o male POD#2  s/p robot simple, stayed for resolution of hematuria, improving today    - CBI clamped      Discussed with Dr. Shaffer    --  Pamela López MD  Urology Resident    "

## 2022-06-17 NOTE — PLAN OF CARE
Discharge instructions reviewed with pt.  IV removed.  Discharge medications given to pt.  Catheter changed to leg bag.  Instructions shown and handout given for caring for chapman/leg bag.  Pt left unit at 1530 to awaiting transportation provided by family at door [address].  Denied pain at time of discharge.

## 2022-06-17 NOTE — PLAN OF CARE
Goal Outcome Evaluation:    Shift: 1900-0730    Patient alert & oriented x4. VSS on RA. Both PIV's are saline locked. Patient is passing flatus. Bowel sounds normoactive. Hand irrigation done x1. A few clots noted during irrigation. Patient pain is well controlled with PRN & scheduled pain medications. Lung sounds clear. ROBINSON drain with moderate serosanguineous output. Dressing C/D/I to ROBINSON drain site. Patient tolerating diet. No reports of nausea. CMS intact. Patient is up SBA in room & halls. PCD's on. Abdominal lap sites clean, dry, & FABIO. Encouraging incentive spirometer use. CBI running slow to moderate. Possible discharge today. Will continue to monitor.

## 2022-06-18 ENCOUNTER — NURSE TRIAGE (OUTPATIENT)
Dept: NURSING | Facility: CLINIC | Age: 72
End: 2022-06-18
Payer: COMMERCIAL

## 2022-06-18 NOTE — TELEPHONE ENCOUNTER
"Pt phoned because he had a partial prostatectomy - pt has a leg bag and states that the blue port is touching his leg - pt next stated that he is currently not on the leg bag but is using the \"big bag\" and the blue dome is touching his leg.     Writer stated that it is hard when I cannot see pt but that if pt is on the \"big bag\" it should not be touching his body and hanging lower than his waste.     Per protocol - home care     [place] [facility] [place] them look and make sure he is understanding how to care for his catheter and how to switch between a leg bag and a larger bag    Pt is seeing urine in the bag     Care advice given per protocol and when to call back. Pt verbalized understanding and agrees to plan of care.    Windy Rodgers RN  [facility] Nurse Advisor  4:13 AM 6/18/2022      COVID 19 Nurse Triage Plan/Patient Instructions    Please be aware that novel coronavirus (COVID-19) may be circulating in the community. If you develop symptoms such as fever, cough, or SOB or if you have concerns about the presence of another infection including coronavirus (COVID-19), please contact your health care provider or visit [email or web address].     Disposition/Instructions    Home care recommended. Follow home care protocol based instructions.    Thank you for taking steps to prevent the spread of this virus.  o Limit your contact with others.  o Wear a simple mask to cover your cough.  o Wash your hands well and often.    Resources    [facility]: About COVID-19: [email or web address]    CDC: What to Do If You're Sick: www.cdc.gov/coronavirus/2019-ncov/about/steps-when-sick.html    CDC: Ending Home Isolation: www.cdc.gov/coronavirus/2019-ncov/hcp/disposition-in-home-patients.html     CDC: Caring for Someone: www.cdc.gov/coronavirus/2019-ncov/if-you-are-sick/care-for-someone.html     [facility]: Interim Guidance for Hospital Discharge to Home: " www.health.[facility]/diseases/coronavirus/hcp/hospdischarge.pdf    [facility] clinical trials (COVID-19 research studies): [email or web address]     Below are the COVID-19 hotlines at the [facility] of Health ([facility]). Interpreters are available.   o For health questions: Call 176-632-6665 or 1-885.567.3047 (7 a.m. to 7 p.m.)  o For questions about schools and childcare: Call 753-714-9511 or 1-472.226.1621 (7 a.m. to 7 p.m.)                       Reason for Disposition    Urinary catheter care, questions about    Additional Information    Negative: [1] Catheter was accidentally pulled-out AND [2] bright red continuous bleeding    Negative: SEVERE abdominal pain    Negative: Fever > 100.4 F (38.0 C)    Negative: [1] Drinking very little AND [2] dehydration suspected (e.g., no urine > 12 hours, very dry mouth, very lightheaded)    Negative: Patient sounds very sick or weak to the triager    Negative: Catheter was accidentally pulled-out    Negative: [1] Catheter is broken AND [2] is not usable    Negative: Bleeding around catheter (e.g., from penis or female urethra)    Negative: Lower abdominal pain or distention    Negative: [1] No urine in bag > 4 hours AND [2] catheter is not kinked    Negative: [1] Tea-colored or slightly red urine lasts > 24 hours AND [2] not cleared by increasing fluid intake    AND [3] no recent prostate or bladder surgery    Negative: [1] Cloudy urine lasts > 24 hours AND [2] not cleared by increasing fluid intake    Negative: [1] Bloody or red-colored urine  AND [2] no recent prostate or bladder surgery  (Exception: brief episode and urine now clear)    Negative: [1] Bloody or red-colored urine  AND [2] prostate or bladder surgery > 3 days (72 hours) ago    Negative: Urine smells bad    Negative: [1] Catheter is broken or cracked AND [2] is still usable    Negative: Leakage of urine around catheter    Negative: No urine in bag < 4 hours     Negative: [1] Prostate surgery or other urologic surgery < 3 days (72 hours) ago AND [2] blood present in urine    Negative: Cloudy urine, present < 24 hours    Negative: Tea-colored or red-tinged urine, present < 24 hours    Protocols used: URINARY CATHETER SYMPTOMS AND [id number]

## 2022-06-20 ENCOUNTER — TELEPHONE (OUTPATIENT)
Dept: UROLOGY | Facility: CLINIC | Age: 72
End: 2022-06-20
Payer: COMMERCIAL

## 2022-06-20 NOTE — TELEPHONE ENCOUNTER
Appointment rescheduled to 6/28/22 at 8:30am. Attempted to reach patient but no answer. Left message stating that his appointment has been rescheduled to 6/28/22 at 8:30am and to call with any questions in the meantime.    Peg Rosenbaum RN, BSN

## 2022-06-20 NOTE — TELEPHONE ENCOUNTER
[facility] Call Center    Phone Message    May a detailed message be left on voicemail: yes     Reason for Call: Patient is currently scheduled for 6/30/22.  Patient received message from clinic to reschedule appt.  Writer checked notes, per notes, reschedule patient for 6/28/22.  That appt date and time is currently on hold for another patient.  Writer not able to schedule appt and not able to reach back line.  Please reach out to patient to reschedule appt. Per patient, he has an appt today at 2:00 p.m. but will be available anytime before and after appt.  You can leave a message in regards to appt time and date.  Patient would like to be seen sooner.    Action Taken: Message routed to:  [facility] ([facility] Urology    Travel Screening: Not Applicable

## 2022-06-20 NOTE — TELEPHONE ENCOUNTER
Called Patient to clarifyed if he needed extra stat locks. Patients stated that is what he needed. Staff placed a bag at the  with stat locks in them. Patient states he will pick them up tomorrow 6.21.22 Patient told to go to desk D on the second floor to grab stat locks.

## 2022-06-20 NOTE — TELEPHONE ENCOUNTER
The patient called back. Writer relayed information below. The patient is wondering where and/or if he can get more patches that hold his leg bag/tubing? He says he put it on while he was standing, then when he went to sit it was too tight. Please advise. Thank you.

## 2022-06-20 NOTE — DISCHARGE SUMMARY
[facility]    Discharge Summary  Urology    Date of Admission:  6/15/2022  Date of Discharge:  6/17/2022  3:26 PM  Discharging Provider: Angel Shaffer MD    Discharge Diagnoses   Patient Active Problem List   Diagnosis     Urinary urgency     Acute blood loss anemia     Acute kidney injury (H)     Arthritis of carpometacarpal (CMC) joint of right thumb     Bleeding     BPH with urinary obstruction     Calculus of kidney     Controlled substance agreement terminated     De Quervain's tenosynovitis, right     Depression     Elevated prostate specific antigen (PSA)     Essential hypertension     Gynecomastia     Hematuria     Hyperlipidemia     Miosis     Pseudophakia of both eyes     Osteoarthritis of multiple joints     Pain of right thumb     Obsessive-compulsive disorder     Obesity     Myofascial pain syndrome       Procedure/Surgery Information   Procedure: Procedure(s):  ROBOTIC ASSISTED LAPAROSCOPIC SIMPLE PROSTATECTOMY   Surgeon(s): Surgeon(s) and Role:     * Angel Shaffer MD - Primary     * Leticia Conti PA-C - Assisting   Specimens: ID Type Source Tests Collected by Time Destination   1 : PROSTATE ADENOMA Tissue Prostate SURGICAL PATHOLOGY EXAM Angel Shaffer MD 6/15/2022 10:38 AM       Non-operative procedures None performed     History of Present Illness   Boris Zambrano is a 72 year old man with significant BPH and LUTS with an MRI showing 105 g prostate.  He was counseled on treatment options and elects to proceed with the above surgery.    Hospital Course   Boris Zambrano was admitted on 6/15/2022.  The following problems were addressed during his hospitalization:  He underwent the above surgery without complications.  His continuous bladder irrigation was slowly weaned and by postoperative day 2 he was deemed ready for discharge.    Post-operative pain control: included Hydromorphone (dilaudid) IV and will be Oxycodone on discharge.     Medications discontinued or  adjusted during this hospitalization: New narcotics initiated: Oxycodone     Antibiotics prescribed at discharge: See below    Imaging study follow up needs:   -None performed    Significant Findings: Large prostate adenoma removed     Angel Shaffer MD    Discharge Disposition   Discharged to home   Condition at discharge: Stable    Pending Results   Final pathology results:   Final Diagnosis   Prostate, adenoma, simple prostatectomy:  - Benign prostatic hyperplasia (BPH).  - 81.1 g     Unresulted Labs Ordered in the Past 30 Days of this Admission     No orders found from 5/16/2022 to 6/16/2022.          Primary Care Physician   Boris Rowland        Consultations This Hospital Stay   None    Time Spent on this Encounter   I have spent greater than 30 minutes on this discharge.    Discharge Orders      When to call - Contact Surgeon Team    You may experience symptoms that require follow-up before your scheduled appointment. Contact your Surgeon Team if you are concerned about pain control, large amount of bleeding, blood clots, constipation, or if you experience signs of infection (fever, growing tenderness at the surgery site, a large amount of drainage, severe pain, foul-smelling drainage, redness or swelling.     When to call - Reach out to Urgent Care    If you are experiencing uncontrolled Nausea and Vomiting, uncontrolled pain, inability to urinate and uncomfortable, and in need of immediate care, and you are NOT able to reach your Surgeon Team, go to an Urgent Care clinic. Do NOT go to the Emergency Room unless you have shortness of breath, chest pain, or other signs of a medical emergency.     When to call - Reasons to Call 911    Call 911 immediately if you experience sudden-onset chest pain, arm weakness/numbness, slurred speech, or shortness of breath     Symptoms - Fever Management    A low grade fever can be expected after surgery. Your Provider many have prescribed an Opioid pain medication that also  contains acetaminophen (TYLENOL) that may help with Fever management.  Do NOT take additional acetaminophen (TYLENOL) in combination with an Opioid/acetaminophen (TYLENOL) product. Read the labels on your Over The Counter (OTC) medications with care.     Symptoms - Reduced Urine Output    If it has been greater than 8 hours since you have urinated despite drinking plenty of water, call your Surgeon Team.     No driving or operating machinery    Do NOT drive any vehicle or operate mechanical equipment for 24 hours following the end of your surgery.  Even though you may feel normal, your reactions may be affected by Anesthesia medication you received.     No Alcohol    Do NOT drink alcoholic beverages for 24 hours following your surgery and while taking pain medications.     Diet Instructions    Follow your surgeon's orders for any diet restrictions.  If you did not receive any diet restrictions, you may drink clear liquids (apple juice, ginger ale, 7-up, broth, etc.), and progress to your regular diet as you feel able. It is important to stay well-hydrated after surgery and drink plenty of water.     Discharge Instructions - Comfort and Pain Management    Pain after surgery is normal and expected. You will have some amount of pain after surgery. Your pain will improve with time. There are several things you can do to help reduce your pain including: rest, ice, and using pain medications as needed. Use pain interventions and don't wait until pain level is out of control. Contact your Surgeon Team if you have pain that persists or worsens after surgery despite rest, ice, and taking your medication(s) as prescribed. You may have a dry mouth, a sore throat, muscles aches or trouble sleeping, and these symptoms should go away after 24 hours.     Discharge Instructions - Rest    Rest and relax for the next 24 hours. Make arrangements to have someone stay with you overnight, and avoid hazardous and strenuous activities.  Do  NOT make any important decisions for the next 24 hours.     Reason for your hospital stay    Prostate surgery     Follow-up and recommended labs and tests     Follow up with Dr. Shaffer as scheduled     Activity    See general instructions     Diet    Follow this diet upon discharge: Regular. Stay well hydrated.     Discharge Medications   Discharge Medication List as of 6/17/2022  2:34 PM      START taking these medications    Details   ciprofloxacin (CIPRO) 500 MG tablet Take 1 tablet (500 mg) by mouth 2 times daily for 3 days Start taking the day before your catheter is removed., Disp-6 tablet, R-0, E-Prescribe      docusate sodium (COLACE) 100 MG tablet Take 1 tablet (100 mg) by mouth daily for 30 days, Disp-30 tablet, R-0, E-Prescribe      oxybutynin ER (DITROPAN XL) 5 MG 24 hr tablet Take 1 tablet (5 mg) by mouth daily for 14 days Take daily until the day prior to chapman removal., Disp-14 tablet, R-0, E-Prescribe      oxyCODONE (ROXICODONE) 5 MG tablet Take 1 tablet (5 mg) by mouth every 6 hours as needed for pain, Disp-9 tablet, R-0, E-Prescribe         CONTINUE these medications which have NOT CHANGED    Details   atorvastatin (LIPITOR) 10 MG tablet Take 10 mg by mouth daily, Historical      clonazePAM (KLONOPIN) 1 MG tablet Take 1 tablet by mouth 2 times daily., Disp-60 tablet, R-5, HistoricalPer request from ,writer is just making a notation/entry as follows:  signed a hard copy script for #60 tablets with #5 additional refills and was faxed to [facility].      cycloSPORINE (RESTASIS) 0.05 % ophthalmic emulsion Place 1 drop into both eyes 2 times daily, Historical      FLUoxetine (PROZAC) 20 MG capsule Take 40 mg by mouth 2 times daily (20 MG X 2 = 40 MG), Historical      lisinopril (ZESTRIL) 10 MG tablet Take 1 tablet by mouth daily, Historical      Misc Natural Products (PROSTATE SUPPORT) 300-15 MG TABS Take 1 tablet by mouth every evening, Historical      OVER-THE-COUNTER Take 2 tablets  by mouth every morning Medication Name: Youthful Mind Tab, Historical      traMADol (ULTRAM) 50 MG tablet Take 50 mg by mouth 4 times daily as needed for pain, Historical         STOP taking these medications       tamsulosin (FLOMAX) 0.4 MG capsule Comments:   Reason for Stopping:             Allergies   No Known Allergies  Data   Most Recent 3 CBC's:Recent Labs   Lab Test 06/17/22  0944 06/16/22  0605 06/15/22  1145   WBC 12.0* 14.9* 13.7*   HGB 11.4* 12.1* 13.0*   MCV 87 90 91    203 203      Most Recent 3 BMP's:  Recent Labs   Lab Test 06/17/22  0944 06/17/22  0559 06/16/22  0605 06/15/22  1145     --  137 141   POTASSIUM 3.5  --  4.0 3.6   CHLORIDE 105  --  106 108   CO2 28  --  27 25   BUN 13  --  14 15   CR 0.99  --  1.25 1.12   ANIONGAP 5  --  4 8   SAMUEL 8.7  --  8.6 9.2   * 90 117* 155*     Most Recent 2 LFT's:No lab results found.  Most Recent INR's and Anticoagulation Dosing History:  Anticoagulation Dose History    There is no flowsheet data to display.       Most Recent 3 Troponin's:No lab results found.  Most Recent Cholesterol Panel:No lab results found.  Most Recent 6 Bacteria Isolates From Any Culture (See EPIC Reports for Culture Details):No lab results found.  Most Recent TSH, T4 and A1c Labs:No lab results found.

## 2022-06-24 ENCOUNTER — NURSE TRIAGE (OUTPATIENT)
Dept: NURSING | Facility: CLINIC | Age: 72
End: 2022-06-24

## 2022-06-24 NOTE — TELEPHONE ENCOUNTER
Spoke to pt. Pt reports that urine is draining well into bag. Urine is yellow and clear. No concerns of pain associated with bleeding. Pt reports that overall he is feeling well. Reassured pt that bleeding intermittently is expected for weeks following procedure. Reviewed urgent symptoms to call clinic or go to ER. Pt verbalized understanding. Pt will continue to monitor at home. No other questions noted.     Jolene Shipley RN MSN

## 2022-06-24 NOTE — TELEPHONE ENCOUNTER
Call from sarah Escalante triage note    Reason for Disposition    Other post-op symptom or question    Additional Information    Negative: Sounds like a life-threatening emergency to the triager    Negative: Chest pain    Negative: Difficulty breathing    Negative: Acting confused (e.g., disoriented, slurred speech) or excessively sleepy    Negative: Surgical incision symptoms and questions    Negative: Discomfort (pain, burning or stinging) when passing urine and male    Negative: Discomfort (pain, burning or stinging) when passing urine and female    Negative: Constipation    Negative: New or worsening leg (calf, thigh) pain    Negative: New or worsening leg swelling    Negative: Dizziness is severe, or persists > 24 hours after surgery    Negative: Symptoms arising from use of a urinary catheter (Dooley or Coude)    Negative: Cast problems or questions    Negative: Medication question    Negative: Bright red, wide-spread, sunburn-like rash    Negative: SEVERE headache and after spinal (epidural) anesthesia    Negative: Vomiting and persists > 4 hours    Negative: Vomiting and abdomen looks much more swollen than usual    Negative: Drinking very little and dehydration suspected (e.g., no urine > 12 hours, very dry mouth, very lightheaded)    Negative: Patient sounds very sick or weak to the triager    Negative: Sounds like a serious complication to the triager    Negative: Fever > 100.4 F (38.0 C)    Negative: Caller has URGENT question and triager unable to answer question    Negative: SEVERE post-op pain (e.g., excruciating, pain scale 8-10) that is not controlled with pain medications    Negative: Headache and after spinal (epidural) anesthesia and not severe    Negative: Fever present > 3 days (72 hours)    Negative: Patient wants to be seen    Negative: MILD TO MODERATE post-op pain (e.g., pain scale 1-7) that is not controlled with pain medications    Negative: Caller has NON-URGENT question and triager unable to  "answer question    Answer Assessment - Initial Assessment Questions  1. SYMPTOM: \"What's the main symptom you're concerned about?\" (e.g., pain, fever, vomiting)      Diarrhea, blood around catheter with with bowel movement  2. ONSET: \"When did symptoms  start?\"      3-4 days ago  3. SURGERY: \"What surgery was performed?\"      ROBOTIC ASSISTED LAPAROSCOPIC SIMPLE PROSTATECTOMY  4. DATE of SURGERY: \"When was surgery performed?\"       6/15/22  5. ANESTHESIA: \" What type of anesthesia did you have?\" (e.g., general, spinal, epidural, local)      general  6. PAIN: \"Is there any pain?\" If so, ask: \"How bad is it?\"  (Scale 1-10; or mild, moderate, severe)      Mild, using tylenol only  7. FEVER: \"Do you have a fever?\" If so, ask: \"What is your temperature, how was it measured, and when did it start?\"      denies  8. VOMITING: \"Is there any vomiting?\" If yes, ask: \"How many times?\"      none  9. BLEEDING: \"Is there any bleeding?\" If so, ask: \"How much?\" and \"Where?\"      From around catheter, small amount when has bowel movement  10. OTHER SYMPTOMS: \"Do you have any other symptoms?\" (e.g., drainage from wound, painful urination, constipation)        Incisions healing, urine clear yellow, no clots    Protocols used: POST-OP SYMPTOMS AND [id number]      "

## 2022-06-28 ENCOUNTER — OFFICE VISIT (OUTPATIENT)
Dept: UROLOGY | Facility: CLINIC | Age: 72
End: 2022-06-28
Payer: COMMERCIAL

## 2022-06-28 DIAGNOSIS — N13.8 BPH WITH URINARY OBSTRUCTION: Primary | ICD-10-CM

## 2022-06-28 DIAGNOSIS — R97.20 ELEVATED PROSTATE SPECIFIC ANTIGEN (PSA): ICD-10-CM

## 2022-06-28 DIAGNOSIS — N40.1 BPH WITH URINARY OBSTRUCTION: Primary | ICD-10-CM

## 2022-06-28 PROCEDURE — 99024 POSTOP FOLLOW-UP VISIT: CPT | Performed by: UROLOGY

## 2022-06-28 NOTE — PROGRESS NOTES
"[facility]  CHIEF COMPLAINT   It was my pleasure to see Boris Zambrano who is a 72 year old male for follow-up of Elevated PSA, gross hematuria.      HPI   Boris Zambrano is a very pleasant 72 year old male   Last saw Dr. Gonzalez - 1/19/22:  Boris Zambrano is a 71 year old male being seen for follow-up urology issues.     History of BPH symptoms and elevated PSA.  Prostate MRI was pirads 2.     PCNL 2017: history of staghorn calculus s/p PCNL at [facility] with Dr. 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       He has trouble postponing, but can void well at this time.    Has had prostate biopsy several times, these were negative.  He has been on over-the-counter \"Prostagenix\" , feels this helps his lower urinary tract symptoms.     1/2/22- he voided, noted gross painless hematuria. Urine now clear.     In the past took tamsulosin 0.4mg QD.- not taking now.    2/10/22:  His biggest issues is urgency  He drinks gatorade, water, and 1 cup of coffee  He does drink about 1 gallon in total - for kidney stone prevention  He did have an episode of gross hematuria in January    Has had 4 biopsies - he reports last biopsy in 1980s    3/3/22:  Follow-up today for cystoscopy and to review his prostate MRI and CT urogram  He notes continued bothersome urination and is very hopeful that this can be fixed    4/28/2022:  Follow-up today to review his biopsy results  He did well after the biopsy with no issues  He continues to be very bothered by his urination    TODAY 6/28/2022:  He is status post a robotic simple prostatectomy on 6/15/2022  He did well in the postoperative period with no complications  He returns today for Dooley catheter removal    PHYSICAL EXAM  Patient is a 72 year old  male   Vitals: There were no vitals taken for this visit.  There is no height or weight on file to calculate BMI.  General Appearance Adult: "   Alert, no acute distress, oriented  HENT: throat/mouth:normal, good dentition  Lungs: no respiratory distress, or pursed lip breathing  Heart: No obvious jugular venous distension present  Abdomen: soft, nontender, no organomegaly or masses  Incisions: clean, dry and intact, peeling skin glue in place, no evidence of a hernia or infection  Musculoskeltal: extremities normal, no peripheral edema  Skin: no suspicious lesions or rashes  Neuro: Alert, oriented, speech and mentation normal  Psych: affect and mood normal  Gait: Normal    PSA history  (from old records)   04/2014           7.71  04/2015           11.64  05/2017           11.08  07/2018           11.6  08/2019           16.19  02/2020           14.7  8/2020             13.24  11/2020           13.0  10/2021           17.04    CR history:  1/7/2021 1.20  10/22/2021 1.31  2/17/2021 1.27    PATHOLOGY:  4/21/22:  Final Diagnosis   A. - L.  Prostate, biopsy:  - Benign prostatic tissue      6/15/2022:  Final Diagnosis   Prostate, adenoma, simple prostatectomy:  - Benign prostatic hyperplasia (BPH).  - 81.1 g         IMAGING:  All pertinent imaging reviewed:    All imaging studies reviewed by me.  I personally reviewed these imaging films.  A formal report from radiology will follow.    MRI PROSTATE 3/1/22:  FINDINGS:  Size: 4.7 x 5.9 x 7.3 cm, 105.3 grams  Hemorrhage: Absent  Peripheral zone: Heterogeneous on T2-weighted images. Regions of  mildly decreased signal on ADC or DWI which are best characterized as  PI-RADS 2 without highly suspicious lesion.  Transition zone: Enlarged with BPH changes. Transition zone nodules  which are circumscribed or mostly encapsulated without diffusion  restriction.  PI-RADS 2.  No highly suspicious nodules.     Neurovascular bundles: No suspicion of involvement by malignancy  Seminal vesicles: Not involved by tumor.  Lymph nodes: Two sub-5 mm lymph nodes posterior to the bilateral  seminal vesicles (image [id number] and 33),  unchanged since 2/22/2020.  Bones: No suspicious lesions. Degenerative changes of the visualized  cervical spine.  Other pelvic organs: Colonic diverticulosis. Small fat-containing left  inguinal hernia.                                                               IMPRESSION:  1. Based on the most suspicious abnormality, this exam is  characterized as PIRADS 2 - Clinically significant cancer is unlikely  to be present.?  2. Marked prostatomegaly.  3. Colonic diverticulosis.        CT 3/1/22:  IMPRESSION:   1. CT urogram demonstrates no evidence of upper urinary tract  urothelial malignancy or urinary tract stone.  2. Stable embolization material within the lower pole of the right  kidney.  3. Intermediate density (35 HU), nonenhancing cyst arising from the  left mid kidney is unchanged compared to 2/22/2020, compatible with a  hemorrhagic/proteinaceous cyst.   4. Prostatomegaly.       ASSESSMENT and PLAN  72-year-old man with history of marked prostatic hypertrophy, BPH, gross hematuria and elevated PSA with prior history of kidney stones    BPH with LUTS  -He did well after his robotic simple prostatectomy  - Dooley catheter removal today  - We discussed the expected postoperative healing course  - Order for physical therapy placed  - Follow-up in 3 months    Elevated PSA  -I reviewed his PSA history and trend  -He has had prior biopsies, however he reports his last biopsy was in the 1980s  -I reviewed his updated prostate MRI and reviewed these images personally.  We discussed that there is no evidence of a PI-RADS 3 or greater lesion.  He does have a massively enlarged prostate measuring 105 g  -Reviewed his updated prostate biopsy results which was reassuring with no evidence of malignancy  - No evidence of prostate cancer in his adenoma specimen  - We will plan for PSA recheck in about 3 months    Time spent: 30 minutes spent on the date of the encounter doing chart review, history and exam, documentation and  further activities as noted above.      Angel Shaffer MD   Urology  [facility]  [facility] Phone: [phone]  [facility] Phone: [phone]

## 2022-07-19 ENCOUNTER — THERAPY VISIT (OUTPATIENT)
Dept: PHYSICAL THERAPY | Facility: CLINIC | Age: 72
End: 2022-07-19
Attending: UROLOGY
Payer: COMMERCIAL

## 2022-07-19 DIAGNOSIS — Z90.79 S/P PROSTATECTOMY: ICD-10-CM

## 2022-07-19 DIAGNOSIS — R39.15 URGENCY OF URINATION: ICD-10-CM

## 2022-07-19 DIAGNOSIS — R32 URINARY INCONTINENCE: ICD-10-CM

## 2022-07-19 PROCEDURE — 97110 THERAPEUTIC EXERCISES: CPT | Mod: GP | Performed by: PHYSICAL THERAPIST

## 2022-07-19 PROCEDURE — 97161 PT EVAL LOW COMPLEX 20 MIN: CPT | Mod: GP | Performed by: PHYSICAL THERAPIST

## 2022-07-19 PROCEDURE — 97535 SELF CARE MNGMENT TRAINING: CPT | Mod: GP | Performed by: PHYSICAL THERAPIST

## 2022-07-19 NOTE — PROGRESS NOTES
"Physical Therapy Initial Evaluation  Subjective:  The history is provided by the patient. No  was used.   Patient Health History  Boris Zambrano being seen for s/p prostatectomy, urinary incontinence.     Date of Onset: 6/28/22 MD order for PT.   Problem occurred: reports having a prostatectomy on 6/15/22 due to BPH with urinary obstruction. Currently, he reports minimal urine leakage, using 1 Depend pad in a 24 hr period. He does c/o urinary urgency and at times difficulty getting to the toilet before leaking urine.    Pain is reported as 0/10 on pain scale.  General health as reported by patient is excellent.  Pertinent medical history includes: depression and high blood pressure.   Red flags:  None as reported by patient.  Medical allergies: none.   Surgeries include:  Other. Other surgery history details: prostatectomy 6/2022, kidney stone removal in 2017 .    Current medications:  Anti-depressants and high blood pressure medication.    Current occupation is retired .                     Therapist Generated HPI Evaluation         Type of problem:  Incontinence and other (s/P prostatectomy).    This is a new condition.  Condition occurred with:  After surgery.          Since onset symptoms are gradually improving.  Symptoms are exacerbated by other (unsure what causes leakage,small amount, usually seems related to urgency)  and relieved by other (doing kegels seem to help).      Barriers include:  None as reported by patient.                        Objective:  System                                 Pelvic Dysfunction Evaluation:    Bladder/Pelvic Problems:    Storage Problem:  Mixed incontinence and urgency            Abdominal Wall:        Scar Mobility:  Abdominal incisions well healed, non tender with palpation        External Assessment:  External assessment pelvic: strong PFMC observed in supine, 10\" hold, repeateable, good relaxation phase with no substitution.  Skin Condition:  " Normal      Tissue Symmetry:  Normal    Muscle Contraction/Perineal Mobility:  Elevation and urogential triangle descent    Additional History:        Caffeine Consumption:  8 oz                      General     ROS    Assessment/Plan:    Patient is a 72 year old male with pelvic complaints.    Patient has the following significant findings with corresponding treatment plan.                Diagnosis 1:  S/ P prostatectomy, urinary incontinence  Impaired muscle performance - neuro re-education and home program  Decreased function - therapeutic activities and home program    Therapy Evaluation Codes:   1) History comprised of:   Personal factors that impact the plan of care:      None.    Comorbidity factors that impact the plan of care are:      None.     Medications impacting care: None.  2) Examination of Body Systems comprised of:   Body structures and functions that impact the plan of care:      Pelvis.   Activity limitations that impact the plan of care are:      Frequency, Urgency and Urinary incontinence.  3) Clinical presentation characteristics are:   Stable/Uncomplicated.  4) Decision-Making    Low complexity using standardized patient assessment instrument and/or measureable assessment of functional outcome.  Cumulative Therapy Evaluation is: Low complexity.    Previous and current functional limitations:  (See Goal Flow Sheet for this information)    Short term and Long term goals: (See Goal Flow Sheet for this information)     Communication ability:  Patient appears to be able to clearly communicate and understand verbal and written communication and follow directions correctly.  Treatment Explanation - The following has been discussed with the patient:   RX ordered/plan of care  Anticipated outcomes  Possible risks and side effects  This patient would benefit from PT intervention to resume normal activities.   Rehab potential is good.    Frequency:  1 X week, once daily  Duration:  for 2 weeks then 2x  month for 2 months   Discharge Plan:  Achieve all LTG.  Independent in home treatment program.  Reach maximal therapeutic benefit.    Please refer to the daily flowsheet for treatment today, total treatment time and time spent performing 1:1 timed codes.

## 2022-07-20 NOTE — PROGRESS NOTES
[facility]    OUTPATIENT Physical Therapy ORTHOPEDIC EVALUATION  PLAN OF TREATMENT FOR OUTPATIENT REHABILITATION  (COMPLETE FOR INITIAL CLAIMS ONLY)  Patient's Last Name, First Name, M.I.  YOB: 1950  Boris Zambrano    Provider s Name:  [facility]   Medical Record No.  [id number]   Start of Care Date:  07/19/22   Onset Date:  06/15/22    Type:     _X__PT   ___OT Medical Diagnosis:    Encounter Diagnoses   Name Primary?    Urinary incontinence     Urgency of urination     S/P prostatectomy         Treatment Diagnosis:  S/P prostatectomy, urinary incontinence        Goals:     07/19/22 0700   Pad/Pantiliner usage   Previous Functional Level None   Current Functional Level Number of pads/pantiliners used in a day   Performance Level 1 Depend maxi  in 24 hr period( min saturated)   STG Target Performance Decrease number of pad/pantiliners used in a day to   Performance Level 1 mod thick pad in 24 hr period ( damp only)   Rationale continence throughout the day   Due Date 08/23/22   LTGTarget Performance Decrease number of pad/pantiliners used in a day to   Performance Level 1 min thick pad 24 hr period ( mostly dry)   Rationale continence throughout the day   Due Date 09/27/22   Voiding Patterns   Previous Functional Level   (prior to surgery frequent urination day/ night)   Current Functional Level Times a day that urgency is experienced;Times during the night that patient voids   Peformance level day: every 1-2 hrs night: 2   STG Target Performance Reduce times in a day urgency is experienced to;Reduce times a night that patient voids to    Performance Level day: every 2-3 hrs night: 1   Rationale continence throughout the day;continence throughout the night;to establish restorative sleep pattern;work toward normal voiding intervals to focus on ADLS, work, or school    Due Date 08/16/22   LTG Target Performance Reduce times in a day urgency is experienced to;Reduce times a night that patient voids to    Performance Level day: every 2-4 hrs night: 0-1   Rationale continence throughout the day;continence throughout the night;to establish restorative sleep pattern;work toward normal voiding intervals to focus on ADLS, work, or school   Due Date 09/13/22       Therapy Frequency:  1x weekly for 2 weeks then 2x monthly for  Predicted Duration of Therapy Intervention:  2 months    Laly Eubanks, PT                 I CERTIFY THE NEED FOR THESE SERVICES FURNISHED UNDER        THIS PLAN OF TREATMENT AND WHILE UNDER MY CARE     (Physician attestation of this document indicates review and certification of the therapy plan).                     Certification Date From:  07/19/22   Certification Date To:  10/16/22    Referring Provider:  Angel Shaffer    Initial Assessment        See Epic Evaluation SOC Date: 07/19/22

## 2022-07-28 ENCOUNTER — THERAPY VISIT (OUTPATIENT)
Dept: PHYSICAL THERAPY | Facility: CLINIC | Age: 72
End: 2022-07-28
Payer: COMMERCIAL

## 2022-07-28 DIAGNOSIS — R39.15 URGENCY OF URINATION: ICD-10-CM

## 2022-07-28 DIAGNOSIS — Z90.79 S/P PROSTATECTOMY: ICD-10-CM

## 2022-07-28 DIAGNOSIS — R32 URINARY INCONTINENCE: Primary | ICD-10-CM

## 2022-07-28 PROCEDURE — 97110 THERAPEUTIC EXERCISES: CPT | Mod: GP | Performed by: PHYSICAL THERAPIST

## 2022-08-15 ENCOUNTER — THERAPY VISIT (OUTPATIENT)
Dept: PHYSICAL THERAPY | Facility: CLINIC | Age: 72
End: 2022-08-15
Payer: COMMERCIAL

## 2022-08-15 DIAGNOSIS — R39.15 URGENCY OF URINATION: ICD-10-CM

## 2022-08-15 DIAGNOSIS — Z90.79 S/P PROSTATECTOMY: ICD-10-CM

## 2022-08-15 DIAGNOSIS — R32 URINARY INCONTINENCE: Primary | ICD-10-CM

## 2022-08-15 PROCEDURE — 97112 NEUROMUSCULAR REEDUCATION: CPT | Mod: GP | Performed by: PHYSICAL THERAPIST

## 2022-08-15 PROCEDURE — 97110 THERAPEUTIC EXERCISES: CPT | Mod: GP | Performed by: PHYSICAL THERAPIST

## 2022-08-31 ENCOUNTER — THERAPY VISIT (OUTPATIENT)
Dept: PHYSICAL THERAPY | Facility: CLINIC | Age: 72
End: 2022-08-31
Payer: COMMERCIAL

## 2022-08-31 DIAGNOSIS — R32 URINARY INCONTINENCE: Primary | ICD-10-CM

## 2022-08-31 DIAGNOSIS — R39.15 URGENCY OF URINATION: ICD-10-CM

## 2022-08-31 DIAGNOSIS — Z90.79 S/P PROSTATECTOMY: ICD-10-CM

## 2022-08-31 PROCEDURE — 97110 THERAPEUTIC EXERCISES: CPT | Mod: GP | Performed by: PHYSICAL THERAPIST

## 2022-08-31 PROCEDURE — 97530 THERAPEUTIC ACTIVITIES: CPT | Mod: GP | Performed by: PHYSICAL THERAPIST

## 2022-09-22 ENCOUNTER — THERAPY VISIT (OUTPATIENT)
Dept: PHYSICAL THERAPY | Facility: CLINIC | Age: 72
End: 2022-09-22
Payer: COMMERCIAL

## 2022-09-22 DIAGNOSIS — Z90.79 S/P PROSTATECTOMY: ICD-10-CM

## 2022-09-22 DIAGNOSIS — R39.15 URGENCY OF URINATION: ICD-10-CM

## 2022-09-22 DIAGNOSIS — N39.46 MIXED STRESS AND URGE URINARY INCONTINENCE: Primary | ICD-10-CM

## 2022-09-22 PROBLEM — R32 URINARY INCONTINENCE: Status: RESOLVED | Noted: 2022-07-19 | Resolved: 2022-09-22

## 2022-09-22 PROCEDURE — 97110 THERAPEUTIC EXERCISES: CPT | Mod: 59 | Performed by: PHYSICAL THERAPIST

## 2022-09-22 PROCEDURE — 97530 THERAPEUTIC ACTIVITIES: CPT | Mod: GP | Performed by: PHYSICAL THERAPIST

## 2022-09-22 NOTE — PROGRESS NOTES
Subjective:  HPI  Physical Exam                    Objective:  System    Physical Exam    General     ROS    Assessment/Plan:    PROGRESS  REPORT    Progress reporting period is from 7/19/22  to 9/22/22.       SUBJECTIVE  Subjective changes noted by patient:  yes.  Subjective: Boris reports using no pads in the past week, minimal to no urinary leakage. He is doing his kegels at least 5x daily and other exercises daily.    Current pain level is NA  .     Previous pain level was  NA  .   Changes in function:  Yes (See Goal flowsheet attached for changes in current functional level)  Adverse reaction to treatment or activity: None    OBJECTIVE  Changes noted in objective findings:  Yes,   Objective: Pads: none in past week , dry in the AM. voids: every 2 hrs in daytime, 0-1 at night. Increased PF strength and endurance- holding 10 sec. with kegels. Improve neutral sitting posture, improved core/glut activation and strength with exercises.     ASSESSMENT/PLAN  Updated problem list and treatment plan: Diagnosis 1:  Post prostatectomy, urinary incontinence  Decreased strength - therapeutic exercise, therapeutic activities and home program  STG/LTGs have been met or progress has been made towards goals:  Yes (See Goal flow sheet completed today.)  Assessment of Progress: The patient has met all of their long term goals.  Patient is meeting short term goals and is progressing towards long term goals.  Self Management Plans:  Patient is independent in a home treatment program.  Patient is independent in self management of symptoms.  I have re-evaluated this patient and find that the nature, scope, duration and intensity of the therapy is appropriate for the medical condition of the patient.  Boris continues to require the following intervention to meet STG and LTG's:  PT    Recommendations:  This patient is ready to be discharged from therapy and continue their home treatment program.    Please refer to the daily flowsheet for  treatment today, total treatment time and time spent performing 1:1 timed codes.

## 2022-09-29 ENCOUNTER — LAB (OUTPATIENT)
Dept: LAB | Facility: CLINIC | Age: 72
End: 2022-09-29
Payer: COMMERCIAL

## 2022-09-29 ENCOUNTER — OFFICE VISIT (OUTPATIENT)
Dept: UROLOGY | Facility: CLINIC | Age: 72
End: 2022-09-29

## 2022-09-29 DIAGNOSIS — N40.1 BPH WITH URINARY OBSTRUCTION: ICD-10-CM

## 2022-09-29 DIAGNOSIS — R97.20 ELEVATED PROSTATE SPECIFIC ANTIGEN (PSA): ICD-10-CM

## 2022-09-29 DIAGNOSIS — Z90.79 H/O PROSTATECTOMY: Primary | ICD-10-CM

## 2022-09-29 DIAGNOSIS — N13.8 BPH WITH URINARY OBSTRUCTION: ICD-10-CM

## 2022-09-29 LAB — PSA SERPL-MCNC: 0.61 UG/L (ref 0–4)

## 2022-09-29 PROCEDURE — 99213 OFFICE O/P EST LOW 20 MIN: CPT | Performed by: UROLOGY

## 2022-09-29 PROCEDURE — 84153 ASSAY OF PSA TOTAL: CPT

## 2022-09-29 PROCEDURE — 36415 COLL VENOUS BLD VENIPUNCTURE: CPT

## 2022-09-29 NOTE — PROGRESS NOTES
Boris Zambrano's goals for this visit include:   Chief Complaint   Patient presents with     RECHECK     Follow Up     PSA follow up         He requests these members of his care team be copied on today's visit information:       PCP: Boris Rowland    Referring Provider:  No referring provider defined for this encounter.    There were no vitals taken for this visit.    Do you need any medication refills at today's visit?     Yakelin Boo LPN on 9/29/2022 at 2:40 PM

## 2022-09-29 NOTE — PROGRESS NOTES
"[facility]  CHIEF COMPLAINT   It was my pleasure to see Boris Zambrano who is a 72 year old male for follow-up of Elevated PSA, gross hematuria.      HPI   Boris Zambrano is a very pleasant 72 year old male   Last saw Dr. Gonzalez - 1/19/22:  Boris Zambrano is a 71 year old male being seen for follow-up urology issues.     History of BPH symptoms and elevated PSA.  Prostate MRI was pirads 2.     PCNL 2017: history of staghorn calculus s/p PCNL at [facility] with Dr. 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       He has trouble postponing, but can void well at this time.    Has had prostate biopsy several times, these were negative.  He has been on over-the-counter \"Prostagenix\" , feels this helps his lower urinary tract symptoms.     1/2/22- he voided, noted gross painless hematuria. Urine now clear.     In the past took tamsulosin 0.4mg QD.- not taking now.    2/10/22:  His biggest issues is urgency  He drinks gatorade, water, and 1 cup of coffee  He does drink about 1 gallon in total - for kidney stone prevention  He did have an episode of gross hematuria in January    Has had 4 biopsies - he reports last biopsy in 1980s    3/3/22:  Follow-up today for cystoscopy and to review his prostate MRI and CT urogram  He notes continued bothersome urination and is very hopeful that this can be fixed    4/28/2022:  Follow-up today to review his biopsy results  He did well after the biopsy with no issues  He continues to be very bothered by his urination    6/28/2022:  He is status post a robotic simple prostatectomy on 6/15/2022  He did well in the postoperative period with no complications  He returns today for Dooley catheter removal    TODAY 9/29/2022:  \"I couldn't be happier\"  He is urinating very well  He did PT and is not having any issues  He is no longer needing any pads  He is sleeping through the " night      PHYSICAL EXAM  Patient is a 72 year old  male   Vitals: There were no vitals taken for this visit.  There is no height or weight on file to calculate BMI.  General Appearance Adult:   Alert, no acute distress, oriented  HENT: throat/mouth:normal, good dentition  Lungs: no respiratory distress, or pursed lip breathing  Heart: No obvious jugular venous distension present  Abdomen: soft, nontender, no organomegaly or masses  Incisions: Well-healed with no evidence of a hernia  Musculoskeltal: extremities normal, no peripheral edema  Skin: no suspicious lesions or rashes  Neuro: Alert, oriented, speech and mentation normal  Psych: affect and mood normal  Gait: Normal    PSA history  (from old records)   04/2014           7.71  04/2015           11.64  05/2017           11.08  07/2018           11.6  08/2019           16.19  02/2020           14.7  8/2020             13.24  11/2020           13.0  10/2021           17.04  9/29/2022  0.61    CR history:  1/7/2021 1.20  10/22/2021 1.31  2/17/2021 1.27  Creatinine   Date Value Ref Range Status   06/17/2022 0.99 0.66 - 1.25 mg/dL Final        PATHOLOGY:  4/21/22:  Final Diagnosis   A. - L.  Prostate, biopsy:  - Benign prostatic tissue      6/15/2022:  Final Diagnosis   Prostate, adenoma, simple prostatectomy:  - Benign prostatic hyperplasia (BPH).  - 81.1 g         IMAGING:  All pertinent imaging reviewed:    All imaging studies reviewed by me.  I personally reviewed these imaging films.  A formal report from radiology will follow.    MRI PROSTATE 3/1/22:  FINDINGS:  Size: 4.7 x 5.9 x 7.3 cm, 105.3 grams  Hemorrhage: Absent  Peripheral zone: Heterogeneous on T2-weighted images. Regions of  mildly decreased signal on ADC or DWI which are best characterized as  PI-RADS 2 without highly suspicious lesion.  Transition zone: Enlarged with BPH changes. Transition zone nodules  which are circumscribed or mostly encapsulated without diffusion  restriction.  PI-RADS 2.  No  highly suspicious nodules.     Neurovascular bundles: No suspicion of involvement by malignancy  Seminal vesicles: Not involved by tumor.  Lymph nodes: Two sub-5 mm lymph nodes posterior to the bilateral  seminal vesicles (image [id number] and 33), unchanged since 2/22/2020.  Bones: No suspicious lesions. Degenerative changes of the visualized  cervical spine.  Other pelvic organs: Colonic diverticulosis. Small fat-containing left  inguinal hernia.                                                               IMPRESSION:  1. Based on the most suspicious abnormality, this exam is  characterized as PIRADS 2 - Clinically significant cancer is unlikely  to be present.?  2. Marked prostatomegaly.  3. Colonic diverticulosis.        CT 3/1/22:  IMPRESSION:   1. CT urogram demonstrates no evidence of upper urinary tract  urothelial malignancy or urinary tract stone.  2. Stable embolization material within the lower pole of the right  kidney.  3. Intermediate density (35 HU), nonenhancing cyst arising from the  left mid kidney is unchanged compared to 2/22/2020, compatible with a  hemorrhagic/proteinaceous cyst.   4. Prostatomegaly.       ASSESSMENT and PLAN  72-year-old man with history of marked prostatic hypertrophy, BPH, gross hematuria and elevated PSA with prior history of kidney stones.  Now status post a robotic simple prostatectomy on 6/15/2022    History of BPH with LUTS  -He is doing extremely well after his robotic simple prostatectomy  - He is urinating extremely well and is very happy with the outcome    Elevated PSA  -I reviewed his PSA history and trend  -He has had prior biopsies, however he reports his last biopsy was in the 1980s  -I reviewed his updated prostate MRI and reviewed these images personally.  We discussed that there is no evidence of a PI-RADS 3 or greater lesion.  He does have a massively enlarged prostate measuring 105 g  -His PSA today decreased dramatically to 0.61 from 17.0.  - We discussed  that his risk of developing any significant prostate cancer is extremely small  Could consider cessation of PSA monitoring    Time spent: 20 minutes spent on the date of the encounter doing chart review, history and exam, documentation and further activities as noted above.      Angel Shaffer MD   Urology  [facility]  [facility] Phone: [phone]  [facility] Phone: [phone]

## 2022-09-29 NOTE — LETTER
"    9/29/2022         RE: Boris Zambrano  [address]  [facility] [address]        Dear Colleague,    Thank you for referring your patient, Boris Zambrano, to the [facility]. Please see a copy of my visit note below.    Briefly, Boris is status post his robotic simple prostatectomy on 6/15/2022.  He is doing extremely well with complete resolution of any urinary symptoms.  His PSA also dropped dramatically to 0.61.  Given how well he is doing, we discussed that he may follow-up with me on a as needed basis    Please let me know if you have any questions or concerns.    Thanks,   Angel Shaffer M.D.  Cell: [phone]     Boris Zambrano's goals for this visit include:   Chief Complaint   Patient presents with     RECHECK     Follow Up     PSA follow up         He requests these members of his care team be copied on today's visit information:       PCP: Boris Rowland    Referring Provider:  No referring provider defined for this encounter.    There were no vitals taken for this visit.    Do you need any medication refills at today's visit?     Yakelin Boo LPN on 9/29/2022 at 2:40 PM      [place]  CHIEF COMPLAINT   It was my pleasure to see Boris Zambrano who is a 72 year old male for follow-up of Elevated PSA, gross hematuria.      HPI   Boris Zambrano is a very pleasant 72 year old male   Last saw Dr. Gonzalez - 1/19/22:  Boris Zambrano is a 71 year old male being seen for follow-up urology issues.     History of BPH symptoms and elevated PSA.  Prostate MRI was pirads 2.     PCNL 2017: history of staghorn calculus s/p PCNL at [facility] with Dr. 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       He has trouble postponing, but can void well at this time.    Has had prostate biopsy several times, these were negative.  He has been on over-the-counter \"Prostagenix\" , feels this helps his " "lower urinary tract symptoms.     1/2/22- he voided, noted gross painless hematuria. Urine now clear.     In the past took tamsulosin 0.4mg QD.- not taking now.    2/10/22:  His biggest issues is urgency  He drinks gatorade, water, and 1 cup of coffee  He does drink about 1 gallon in total - for kidney stone prevention  He did have an episode of gross hematuria in January    Has had 4 biopsies - he reports last biopsy in 1980s    3/3/22:  Follow-up today for cystoscopy and to review his prostate MRI and CT urogram  He notes continued bothersome urination and is very hopeful that this can be fixed    4/28/2022:  Follow-up today to review his biopsy results  He did well after the biopsy with no issues  He continues to be very bothered by his urination    6/28/2022:  He is status post a robotic simple prostatectomy on 6/15/2022  He did well in the postoperative period with no complications  He returns today for Dooley catheter removal    TODAY 9/29/2022:  \"I couldn't be happier\"  He is urinating very well  He did PT and is not having any issues  He is no longer needing any pads  He is sleeping through the night      PHYSICAL EXAM  Patient is a 72 year old  male   Vitals: There were no vitals taken for this visit.  There is no height or weight on file to calculate BMI.  General Appearance Adult:   Alert, no acute distress, oriented  HENT: throat/mouth:normal, good dentition  Lungs: no respiratory distress, or pursed lip breathing  Heart: No obvious jugular venous distension present  Abdomen: soft, nontender, no organomegaly or masses  Incisions: Well-healed with no evidence of a hernia  Musculoskeltal: extremities normal, no peripheral edema  Skin: no suspicious lesions or rashes  Neuro: Alert, oriented, speech and mentation normal  Psych: affect and mood normal  Gait: Normal    PSA history  (from old records) "   04/2014           7.71  04/2015           11.64  05/2017           11.08  07/2018           11.6  08/2019           16.19  02/2020           14.7  8/2020             13.24  11/2020           13.0  10/2021           17.04  9/29/2022  0.61    CR history:  1/7/2021 1.20  10/22/2021 1.31  2/17/2021 1.27  Creatinine   Date Value Ref Range Status   06/17/2022 0.99 0.66 - 1.25 mg/dL Final        PATHOLOGY:  4/21/22:  Final Diagnosis   A. - L.  Prostate, biopsy:  - Benign prostatic tissue      6/15/2022:  Final Diagnosis   Prostate, adenoma, simple prostatectomy:  - Benign prostatic hyperplasia (BPH).  - 81.1 g         IMAGING:  All pertinent imaging reviewed:    All imaging studies reviewed by me.  I personally reviewed these imaging films.  A formal report from radiology will follow.    MRI PROSTATE 3/1/22:  FINDINGS:  Size: 4.7 x 5.9 x 7.3 cm, 105.3 grams  Hemorrhage: Absent  Peripheral zone: Heterogeneous on T2-weighted images. Regions of  mildly decreased signal on ADC or DWI which are best characterized as  PI-RADS 2 without highly suspicious lesion.  Transition zone: Enlarged with BPH changes. Transition zone nodules  which are circumscribed or mostly encapsulated without diffusion  restriction.  PI-RADS 2.  No highly suspicious nodules.     Neurovascular bundles: No suspicion of involvement by malignancy  Seminal vesicles: Not involved by tumor.  Lymph nodes: Two sub-5 mm lymph nodes posterior to the bilateral  seminal vesicles (image 7001:[id number] and 33), unchanged since 2/22/2020.  Bones: No suspicious lesions. Degenerative changes of the visualized  cervical spine.  Other pelvic organs: Colonic diverticulosis. Small fat-containing left  inguinal hernia.                                                               IMPRESSION:  1. Based on the most suspicious abnormality, this exam is  characterized as PIRADS 2 - Clinically significant cancer is unlikely  to be present.?  2. Marked prostatomegaly.  3. Colonic  diverticulosis.        CT 3/1/22:  IMPRESSION:   1. CT urogram demonstrates no evidence of upper urinary tract  urothelial malignancy or urinary tract stone.  2. Stable embolization material within the lower pole of the right  kidney.  3. Intermediate density (35 HU), nonenhancing cyst arising from the  left mid kidney is unchanged compared to 2/22/2020, compatible with a  hemorrhagic/proteinaceous cyst.   4. Prostatomegaly.       ASSESSMENT and PLAN  72-year-old man with history of marked prostatic hypertrophy, BPH, gross hematuria and elevated PSA with prior history of kidney stones.  Now status post a robotic simple prostatectomy on 6/15/2022    History of BPH with LUTS  -He is doing extremely well after his robotic simple prostatectomy  - He is urinating extremely well and is very happy with the outcome    Elevated PSA  -I reviewed his PSA history and trend  -He has had prior biopsies, however he reports his last biopsy was in the 1980s  -I reviewed his updated prostate MRI and reviewed these images personally.  We discussed that there is no evidence of a PI-RADS 3 or greater lesion.  He does have a massively enlarged prostate measuring 105 g  -His PSA today decreased dramatically to 0.61 from 17.0.  - We discussed that his risk of developing any significant prostate cancer is extremely small  Could consider cessation of PSA monitoring    Time spent: 20 minutes spent on the date of the encounter doing chart review, history and exam, documentation and further activities as noted above.      Angel Shaffer MD   Urology  [facility]  [facility] Phone: [phone]  [facility] Phone: [phone]          Again, thank you for allowing me to participate in the care of your patient.        Sincerely,        nAgel Shaffer MD

## 2022-10-30 ENCOUNTER — HEALTH MAINTENANCE LETTER (OUTPATIENT)
Age: 72
End: 2022-10-30

## 2022-12-17 ENCOUNTER — HEALTH MAINTENANCE LETTER (OUTPATIENT)
Age: 72
End: 2022-12-17

## 2023-03-14 ENCOUNTER — TELEPHONE (OUTPATIENT)
Dept: UROLOGY | Facility: CLINIC | Age: 73
End: 2023-03-14
Payer: COMMERCIAL

## 2023-03-14 DIAGNOSIS — N50.819 TESTICULAR PAIN: Primary | ICD-10-CM

## 2023-03-14 NOTE — TELEPHONE ENCOUNTER
"Spoke to pt. Pt reports having COVID 3 weeks ago and noted pain in testicles. Right testicle got really large and sensitive while he had COVID. Currently, no pain anymore, but \"it still feels bigger than the left one\". Able to urinate well. Denies any other symptoms. Having regular BM. No redness noted. He states he thinks there might be a \"lump\" noted to right side, size of 1/4-1/2\". Chart and problems list reviewed.    Jolene Shipley, MSN RN    "

## 2023-03-14 NOTE — TELEPHONE ENCOUNTER
[facility] Call Center    Phone Message    May a detailed message be left on voicemail: yes     Reason for Call: Symptoms or Concerns     If patient has red-flag symptoms, warm transfer to triage line    Current symptom or concern: pt stated he had COVID about 3 weeks ago, and ever since then he has had testicular pain, he is unsure if that's related to COVID or not, but would like a call back to discuss his symptoms thank you    Symptoms have been present for: the last couple weeks    Has patient previously been seen for this? No        Are there any new or worsening symptoms? Yes: not improving       Action Taken: Message routed to:  Adult Clinics: Urology p [id number]    Travel Screening: Not Applicable

## 2023-03-15 NOTE — TELEPHONE ENCOUNTER
Angel Shaffer MD Bratsch, Jolene CALLEJAS RN  Caller: Unspecified (Yesterday, 12:37 PM)  Danny Fernández,   Yes, ok to get a testicular ultrasound.     Thanks,   Angel Shaffer M.D.     Spoke to pt. Pt reports having a PCP appointment today. Informed pt of order for ultrasound. Assisted to schedule follow up and send DearLocalhart message with imaging scheduling phone number.     Jolene Shipley, MSN RN

## 2023-09-26 ENCOUNTER — TELEPHONE (OUTPATIENT)
Dept: UROLOGY | Facility: CLINIC | Age: 73
End: 2023-09-26

## 2023-09-26 ENCOUNTER — ANCILLARY PROCEDURE (OUTPATIENT)
Dept: ULTRASOUND IMAGING | Facility: CLINIC | Age: 73
End: 2023-09-26
Attending: UROLOGY
Payer: COMMERCIAL

## 2023-09-26 ENCOUNTER — MYC MEDICAL ADVICE (OUTPATIENT)
Dept: UROLOGY | Facility: CLINIC | Age: 73
End: 2023-09-26

## 2023-09-26 DIAGNOSIS — N50.819 TESTICULAR PAIN: ICD-10-CM

## 2023-09-26 LAB — RADIOLOGIST FLAGS: NORMAL

## 2023-09-26 PROCEDURE — 76870 US EXAM SCROTUM: CPT | Performed by: STUDENT IN AN ORGANIZED HEALTH CARE EDUCATION/TRAINING PROGRAM

## 2023-09-26 PROCEDURE — 93976 VASCULAR STUDY: CPT | Performed by: STUDENT IN AN ORGANIZED HEALTH CARE EDUCATION/TRAINING PROGRAM

## 2023-09-26 NOTE — TELEPHONE ENCOUNTER
"Returned call to Med in radiology.    Per Med:  Incidental finding: \"Heterogeneous right testicle without discrete lesion.\"    Routing to provider to review and advise.  Windy Souza RN         "

## 2023-09-26 NOTE — CONFIDENTIAL NOTE
Per chart review, patient rescheduled appointment and is now scheduled to see Dr. Shaffer on 10/3/23 at 8:45am.    Peg Rosenbaum RN, BSN

## 2023-09-26 NOTE — TELEPHONE ENCOUNTER
[facility] Call Center    Phone Message    May a detailed message be left on voicemail: yes     Reason for Call: Other: Med calling with an incidental finding, no answer on the back line. Please call radiology back asap to discuss      Action Taken: Message routed to:  Other: uro    Travel Screening: Not Applicable

## 2023-09-26 NOTE — TELEPHONE ENCOUNTER
View All Conversations on this Encounter   Angel Shaffer MD  You20 minutes ago (4:26 PM)     BH  Right, I saw this. No concern.    Thanks,  Angel CHAO. Edmund DOMINGUEZ      You  Angel Shaffer MD; [facility] Adult [place]3 hours ago (1:20 PM)     [facility] Dr. Shaffer, please see note re: incidental finding from radiology and advise.  Thanks! Windy Shaffer reviewed.  No follow up needed.    Closing encounter.    Windy Souza, RN

## 2023-10-03 ENCOUNTER — OFFICE VISIT (OUTPATIENT)
Dept: UROLOGY | Facility: CLINIC | Age: 73
End: 2023-10-03
Payer: COMMERCIAL

## 2023-10-03 DIAGNOSIS — N50.82 SCROTAL PAIN: Primary | ICD-10-CM

## 2023-10-03 DIAGNOSIS — N50.89 PARATESTICULAR MASS: ICD-10-CM

## 2023-10-03 PROCEDURE — 99214 OFFICE O/P EST MOD 30 MIN: CPT | Performed by: UROLOGY

## 2023-10-03 RX ORDER — BUSPIRONE HYDROCHLORIDE 15 MG/1
15 TABLET ORAL 2 TIMES DAILY
COMMUNITY

## 2023-10-03 NOTE — NURSING NOTE
Boris Zambrano's chief complaint for this visit includes:  Chief Complaint   Patient presents with    RECHECK     per pt/ testicular pain/med recs in epic location verified  ok per message from Windy        PCP: Boris Rowland    Referring Provider:  No referring provider defined for this encounter.    There were no vitals taken for this visit.  Data Unavailable      No Known Allergies      Do you need any medication refills at today's visit? No    Magdalena sequeira Clinic Assistant- Surgical Specialties

## 2023-10-03 NOTE — PROGRESS NOTES
"[place]  CHIEF COMPLAINT   It was my pleasure to see Boris Zambrano who is a 73 year old male for follow-up of Elevated PSA, gross hematuria.      HPI   Boris Zambrano is a very pleasant 73 year old male   Last saw Dr. Gonzalez - 1/19/22:  Boris Zambrano is a 71 year old male being seen for follow-up urology issues.     History of BPH symptoms and elevated PSA.  Prostate MRI was pirads 2.     PCNL 2017: history of staghorn calculus s/p PCNL at [facility] with Dr. 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       He has trouble postponing, but can void well at this time.    Has had prostate biopsy several times, these were negative.  He has been on over-the-counter \"Prostagenix\" , feels this helps his lower urinary tract symptoms.     1/2/22- he voided, noted gross painless hematuria. Urine now clear.     In the past took tamsulosin 0.4mg QD.- not taking now.    2/10/22:  His biggest issues is urgency  He drinks gatorade, water, and 1 cup of coffee  He does drink about 1 gallon in total - for kidney stone prevention  He did have an episode of gross hematuria in January    Has had 4 biopsies - he reports last biopsy in 1980s    3/3/22:  Follow-up today for cystoscopy and to review his prostate MRI and CT urogram  He notes continued bothersome urination and is very hopeful that this can be fixed    4/28/2022:  Follow-up today to review his biopsy results  He did well after the biopsy with no issues  He continues to be very bothered by his urination    6/28/2022:  He is status post a robotic simple prostatectomy on 6/15/2022  He did well in the postoperative period with no complications  He returns today for Dooley catheter removal    9/29/2022:  \"I couldn't be happier\"  He is urinating very well  He did PT and is not having any issues  He is no longer needing any pads  He is sleeping through the night    TODAY " 10/3/2023:  He is doing very well from a urinary standpoint    Had a bad bout of COVID in March  During that time his right testicle was painful and enlarged   This then resolved  He has noted intermittent episodes of right testicular discomfort    PHYSICAL EXAM  Patient is a 73 year old  male   Vitals: There were no vitals taken for this visit.  There is no height or weight on file to calculate BMI.  General Appearance Adult:   Alert, no acute distress, oriented  HENT: throat/mouth:normal, good dentition  Lungs: no respiratory distress, or pursed lip breathing  Heart: No obvious jugular venous distension present  Abdomen: soft, nontender, no organomegaly or masses  : Normal phallus, normal left testis and epididymis, normal right testis and a firm paratesticular mass versus cyst  Musculoskeltal: extremities normal, no peripheral edema  Skin: no suspicious lesions or rashes  Neuro: Alert, oriented, speech and mentation normal  Psych: affect and mood normal  Gait: Normal    PSA history  (from old records)   04/2014           7.71  04/2015           11.64  05/2017           11.08  07/2018           11.6  08/2019           16.19  02/2020           14.7  8/2020             13.24  11/2020           13.0  10/2021           17.04  9/29/2022  0.61    CR history:  1/7/2021 1.20  10/22/2021 1.31  2/17/2021 1.27  Creatinine   Date Value Ref Range Status   06/17/2022 0.99 0.66 - 1.25 mg/dL Final        PATHOLOGY:  4/21/22:  Final Diagnosis   A. - L.  Prostate, biopsy:  - Benign prostatic tissue      6/15/2022:  Final Diagnosis   Prostate, adenoma, simple prostatectomy:  - Benign prostatic hyperplasia (BPH).  - 81.1 g         IMAGING:  All pertinent imaging reviewed:    All imaging studies reviewed by me.  I personally reviewed these imaging films.  A formal report from radiology will follow.    MRI PROSTATE 3/1/22:  FINDINGS:  Size: 4.7 x 5.9 x 7.3 cm, 105.3 grams  Hemorrhage: Absent  Peripheral zone: Heterogeneous on  T2-weighted images. Regions of  mildly decreased signal on ADC or DWI which are best characterized as  PI-RADS 2 without highly suspicious lesion.  Transition zone: Enlarged with BPH changes. Transition zone nodules  which are circumscribed or mostly encapsulated without diffusion  restriction.  PI-RADS 2.  No highly suspicious nodules.     Neurovascular bundles: No suspicion of involvement by malignancy  Seminal vesicles: Not involved by tumor.  Lymph nodes: Two sub-5 mm lymph nodes posterior to the bilateral  seminal vesicles (image [id number]:[id number] and 33), unchanged since 2/22/2020.  Bones: No suspicious lesions. Degenerative changes of the visualized  cervical spine.  Other pelvic organs: Colonic diverticulosis. Small fat-containing left  inguinal hernia.                                                               IMPRESSION:  1. Based on the most suspicious abnormality, this exam is  characterized as PIRADS 2 - Clinically significant cancer is unlikely  to be present.?  2. Marked prostatomegaly.  3. Colonic diverticulosis.        CT 3/1/22:  IMPRESSION:   1. CT urogram demonstrates no evidence of upper urinary tract  urothelial malignancy or urinary tract stone.  2. Stable embolization material within the lower pole of the right  kidney.  3. Intermediate density (35 HU), nonenhancing cyst arising from the  left mid kidney is unchanged compared to 2/22/2020, compatible with a  hemorrhagic/proteinaceous cyst.   4. Prostatomegaly.    TESTICULAR U/S 9/26/2023:  Findings:  The testes demonstrate normal and symmetric vascularity.  Normal left  testicular echotexture.  Asymmetric heterogeneous right testicle  without discrete lesion. The right testicle measures 4.2 x 1.8 x 2.9  cm and the left measures 3.9 x 2.2 x 2.6 cm. There is no evidence of  torsion.     There is no evidence of a significant hydrocele or varicocele.     Both the right and left epididymis are within normal limits.                                                              Impression:   1. Heterogeneous right testicle without discrete lesion.  Urology  consultation is recommended to guide further management.  2. Normal testicular blood flow.     ASSESSMENT and PLAN  73-year-old man with history of marked prostatic hypertrophy, BPH, gross hematuria and elevated PSA with prior history of kidney stones.  Now status post a robotic simple prostatectomy on 6/15/2022.  Now having right scrotal discomfort    Right scrotal discomfort with a paratesticular cyst versus mass  - I reviewed his recent testicular ultrasound which notes normal bilateral testicles.  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      Angel Shaffer MD   Urology  [facility]  [facility] Phone: [phone]  [facility] Phone: [phone]

## 2023-10-06 ENCOUNTER — ANCILLARY PROCEDURE (OUTPATIENT)
Dept: ULTRASOUND IMAGING | Facility: CLINIC | Age: 73
End: 2023-10-06
Attending: UROLOGY
Payer: COMMERCIAL

## 2023-10-06 DIAGNOSIS — N50.89 PARATESTICULAR MASS: ICD-10-CM

## 2023-10-06 DIAGNOSIS — N50.82 SCROTAL PAIN: ICD-10-CM

## 2023-10-06 PROCEDURE — 76870 US EXAM SCROTUM: CPT

## 2023-10-06 PROCEDURE — 93976 VASCULAR STUDY: CPT

## 2023-10-27 ENCOUNTER — TELEPHONE (OUTPATIENT)
Dept: UROLOGY | Facility: CLINIC | Age: 73
End: 2023-10-27
Payer: COMMERCIAL

## 2023-10-27 NOTE — TELEPHONE ENCOUNTER
Angel Shaffer MD  P [facility] Urology Adult [facility] Boris,    Here is the results of your repeat testicular ultrasound.  Overall this is reassuring and identifies no concerning issues with the testicles or epididymis.    Plan  -Follow-up as needed    Thanks,  Angel CHAO. MD Edmund    Received message above about patients results. Results viewable to patient on [email or web address].   [facility] for patient to call back.       Yakelin Boo LPN on 10/27/2023 at 11:33 AM

## 2023-11-03 NOTE — TELEPHONE ENCOUNTER
Spoke to patient. Relayed the results.     Patient did not have any questions or concerns.     Yakelin Boo LPN on 11/3/2023 at 11:17 AM

## 2024-01-21 ENCOUNTER — HEALTH MAINTENANCE LETTER (OUTPATIENT)
Age: 74
End: 2024-01-21

## 2024-02-22 ENCOUNTER — MYC MEDICAL ADVICE (OUTPATIENT)
Dept: UROLOGY | Facility: CLINIC | Age: 74
End: 2024-02-22
Payer: COMMERCIAL

## 2024-02-22 ENCOUNTER — TELEPHONE (OUTPATIENT)
Dept: UROLOGY | Facility: CLINIC | Age: 74
End: 2024-02-22
Payer: COMMERCIAL

## 2024-02-22 NOTE — TELEPHONE ENCOUNTER
Multiple [employer] message opened by patient. Recommendations sent in a separate chart.    Closing encounter.     Yakelin Boo LPN on 2/22/2024 at 12:57 PM

## 2024-02-22 NOTE — TELEPHONE ENCOUNTER
Left voicemail for patient - patient requested a return call to answer question patient has.     Delilah Mcfadden MA on 2/22/2024 at 11:09 AM

## 2024-02-22 NOTE — TELEPHONE ENCOUNTER
Left voicemail for patient - stated on voicemail that the concern the patient is asking about would be for a colorectal provider, as concern is not on 's protocols.     Closing encounter.   Delilah Mcfadden MA on 2/22/2024 at 10:18 AM

## 2024-02-22 NOTE — TELEPHONE ENCOUNTER
Closing this encounter - [facility] encounter started 2/22    Delilah Mcfadden MA on 2/22/2024 at 12:34 PM

## 2024-02-22 NOTE — TELEPHONE ENCOUNTER
[facility] Call Center    Phone Message    May a detailed message be left on voicemail: yes     Reason for Call: Other: Pt would like a call back about below- stating he has more questions. Please call pt back asap to discuss      Action Taken: Message routed to:  [facility] uro    Travel Screening: Not Applicable

## 2024-06-06 ENCOUNTER — OFFICE VISIT (OUTPATIENT)
Dept: UROLOGY | Facility: CLINIC | Age: 74
End: 2024-06-06
Payer: COMMERCIAL

## 2024-06-06 ENCOUNTER — TELEPHONE (OUTPATIENT)
Dept: UROLOGY | Facility: CLINIC | Age: 74
End: 2024-06-06

## 2024-06-06 DIAGNOSIS — N43.40 SPERMATOCELE OF EPIDIDYMIS: Primary | ICD-10-CM

## 2024-06-06 PROCEDURE — 99214 OFFICE O/P EST MOD 30 MIN: CPT | Performed by: UROLOGY

## 2024-06-06 NOTE — TELEPHONE ENCOUNTER
6/6 Imaging requested to push to PACS from [facility] [phone]:    5/2/24 US Testicular and Scrotum  5/2/24 CT Abdomen/Pelvis     Imaging request form filled out and brought downstairs.   Delilah Mcfadden MA on 6/6/2024 at 9:41 AM

## 2024-06-06 NOTE — PROGRESS NOTES
"[facility]  CHIEF COMPLAINT   It was my pleasure to see Boris Zambrano who is a 74 year old male for follow-up of Elevated PSA, gross hematuria.      HPI   Boris Zambrano is a very pleasant 74 year old male   Last saw Dr. Gonzalez - 1/19/22:  Boris Zambrano is a 71 year old male being seen for follow-up urology issues.     History of BPH symptoms and elevated PSA.  Prostate MRI was pirads 2.     PCNL 2017: history of staghorn calculus s/p PCNL at [facility] with Dr. 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       He has trouble postponing, but can void well at this time.    Has had prostate biopsy several times, these were negative.  He has been on over-the-counter \"Prostagenix\" , feels this helps his lower urinary tract symptoms.     1/2/22- he voided, noted gross painless hematuria. Urine now clear.     In the past took tamsulosin 0.4mg QD.- not taking now.    2/10/22:  His biggest issues is urgency  He drinks gatorade, water, and 1 cup of coffee  He does drink about 1 gallon in total - for kidney stone prevention  He did have an episode of gross hematuria in January    Has had 4 biopsies - he reports last biopsy in 1980s    3/3/22:  Follow-up today for cystoscopy and to review his prostate MRI and CT urogram  He notes continued bothersome urination and is very hopeful that this can be fixed    4/28/2022:  Follow-up today to review his biopsy results  He did well after the biopsy with no issues  He continues to be very bothered by his urination    6/28/2022:  He is status post a robotic simple prostatectomy on 6/15/2022  He did well in the postoperative period with no complications  He returns today for Dooley catheter removal    9/29/2022:  \"I couldn't be happier\"  He is urinating very well  He did PT and is not having any issues  He is no longer needing any pads  He is sleeping through the " night    10/3/2023:  He is doing very well from a urinary standpoint    Had a bad bout of COVID in March  During that time his right testicle was painful and enlarged   This then resolved  He has noted intermittent episodes of right testicular discomfort    TODAY 6/6/2024:  Had an episode of right abd/flank/testicular pain on 5/2/2024  U/S with possible right epididymoorchitis  Prescribed levofloxacin - but only took one pill and developed significant headache  Went back to ER on 5/3 and changed to doxycycline      PHYSICAL EXAM  Patient is a 74 year old  male   Vitals: There were no vitals taken for this visit.  There is no height or weight on file to calculate BMI.  General Appearance Adult:   Alert, no acute distress, oriented  HENT: throat/mouth:normal, good dentition  Lungs: no respiratory distress, or pursed lip breathing  Heart: No obvious jugular venous distension present  Abdomen: soft, nontender, no organomegaly or masses  : Normal phallus, normal left testis and epididymis, normal right testis and a firm paratesticular mass versus cyst which is tender to palpation  Musculoskeltal: extremities normal, no peripheral edema  Skin: no suspicious lesions or rashes  Neuro: Alert, oriented, speech and mentation normal  Psych: affect and mood normal  Gait: Normal    PSA history  (from old records)   04/2014           7.71  04/2015           11.64  05/2017           11.08  07/2018           11.6  08/2019           16.19  02/2020           14.7  8/2020             13.24  11/2020           13.0  10/2021           17.04  9/29/2022  0.61    CR history:  1/7/2021 1.20  10/22/2021 1.31  2/17/2021 1.27  Creatinine   Date Value Ref Range Status   06/17/2022 0.99 0.66 - 1.25 mg/dL Final        PATHOLOGY:  4/21/22:  Final Diagnosis   A. - L.  Prostate, biopsy:  - Benign prostatic tissue      6/15/2022:  Final Diagnosis   Prostate, adenoma, simple prostatectomy:  - Benign prostatic hyperplasia (BPH).  - 81.1 g          IMAGING:  All pertinent imaging reviewed:    All imaging studies reviewed by me.  I personally reviewed these imaging films.  A formal report from radiology will follow.    MRI PROSTATE 3/1/22:  FINDINGS:  Size: 4.7 x 5.9 x 7.3 cm, 105.3 grams  Hemorrhage: Absent  Peripheral zone: Heterogeneous on T2-weighted images. Regions of  mildly decreased signal on ADC or DWI which are best characterized as  PI-RADS 2 without highly suspicious lesion.  Transition zone: Enlarged with BPH changes. Transition zone nodules  which are circumscribed or mostly encapsulated without diffusion  restriction.  PI-RADS 2.  No highly suspicious nodules.     Neurovascular bundles: No suspicion of involvement by malignancy  Seminal vesicles: Not involved by tumor.  Lymph nodes: Two sub-5 mm lymph nodes posterior to the bilateral  seminal vesicles (image [id number] and 33), unchanged since 2/22/2020.  Bones: No suspicious lesions. Degenerative changes of the visualized  cervical spine.  Other pelvic organs: Colonic diverticulosis. Small fat-containing left  inguinal hernia.                                                               IMPRESSION:  1. Based on the most suspicious abnormality, this exam is  characterized as PIRADS 2 - Clinically significant cancer is unlikely  to be present.?  2. Marked prostatomegaly.  3. Colonic diverticulosis.        CT 3/1/22:  IMPRESSION:   1. CT urogram demonstrates no evidence of upper urinary tract  urothelial malignancy or urinary tract stone.  2. Stable embolization material within the lower pole of the right  kidney.  3. Intermediate density (35 HU), nonenhancing cyst arising from the  left mid kidney is unchanged compared to 2/22/2020, compatible with a  hemorrhagic/proteinaceous cyst.   4. Prostatomegaly.    TESTICULAR U/S 9/26/2023:  Findings:  The testes demonstrate normal and symmetric vascularity.  Normal left  testicular echotexture.  Asymmetric heterogeneous right testicle  without  discrete lesion. The right testicle measures 4.2 x 1.8 x 2.9  cm and the left measures 3.9 x 2.2 x 2.6 cm. There is no evidence of  torsion.  There is no evidence of a significant hydrocele or varicocele.  Both the right and left epididymis are within normal limits.                                                             Impression:   1. Heterogeneous right testicle without discrete lesion.  Urology  consultation is recommended to guide further management.  2. Normal testicular blood flow.     U/S TESTICULAR 5/2/2024:  IMPRESSION:   1. Findings compatible with right epididymoorchitis.   2.  Normal left testicle.   3.  Small bilateral hydroceles.     CT ABD/PEL 5/2/2024:  IMPRESSION:    1.  No acute abnormality in the abdomen or pelvis on this noncontrast exam.   2.  Colonic diverticulosis without diverticulitis.   3.  Cholelithiasis.     ASSESSMENT and PLAN  73-year-old man with history of marked prostatic hypertrophy, BPH, gross hematuria and elevated PSA with prior history of kidney stones.  Now status post a robotic simple prostatectomy on 6/15/2022.  Now having right scrotal discomfort    Right scrotal discomfort with a paratesticular cyst versus mass  -He again has a clearly palpable right paratesticular cyst likely a spermatocele which is tender to palpation  - I reviewed his recent imaging  - We discussed that given the ongoing symptoms I would recommend a right spermatocelectomy and order for surgery placed  - This is a chronic problem with progression of symptoms requiring surgical intervention    History of BPH with LUTS  -He is doing extremely well after his robotic simple prostatectomy  - He is urinating extremely well and is very happy with the outcome      Time spent: 15 minutes spent on the date of the encounter doing chart review, history and exam, documentation and further activities as noted above.      Angel Shaffer MD   Urology  [facility]  [facility] Phone: [phone]  [facility] Phone: [phone]

## 2024-06-06 NOTE — NURSING NOTE
Boris Zambrano's goals for this visit include:   Chief Complaint   Patient presents with    RECHECK     Right Testicular Pain, sched per pt, recs in Epic       He requests these members of his care team be copied on today's visit information:     PCP: Boris Rowland    Referring Provider:  Referred Self, MD  No address on file    There were no vitals taken for this visit.    Do you need any medication refills at today's visit?     Delilah Mcfadden MA on 6/6/2024 at 9:12 AM

## 2024-06-07 ENCOUNTER — TELEPHONE (OUTPATIENT)
Dept: UROLOGY | Facility: CLINIC | Age: 74
End: 2024-06-07
Payer: COMMERCIAL

## 2024-06-07 NOTE — TELEPHONE ENCOUNTER
Message sent to scheduling team with request to contact patient to assist in scheduling an in person post op appointment with Dr. Shaffer on 7/11/24 at 10:00am.     Peg Rosenbaum RN, BSN

## 2024-06-07 NOTE — TELEPHONE ENCOUNTER
6/7 Patient scheduled.     Lana sequeira Complex   Dermatology, Surgery, Urology  [facility]

## 2024-06-07 NOTE — TELEPHONE ENCOUNTER
Talked w/ Boris on the phone about dates and came up with we tried for 6/11/24 but could not get that date so we did 6/25/24    Type of surgery: EXCISION, SPERMATOCELE     Location of surgery: [facility]    Date and time of surgery: 6/25/24 told patient that a nurse will call 1 week prior to surgery date with time and arrival time    Surgeon: Dr. Angel Shaffer    Pre-Op Appt Date: Told patient they will need to go to PCP with in 30 days of surgery    Post-Op Appt Date: I could not find a 2-3 week post op time in [facility] so sent them a message to see if they can work in    Packet sent out: Yes in mail        Went over all surgery info with Boris, He knows that they can call me with any questions that come up    Keke P.  [phone]

## 2024-06-12 ENCOUNTER — CARE COORDINATION (OUTPATIENT)
Dept: UROLOGY | Facility: CLINIC | Age: 74
End: 2024-06-12
Payer: COMMERCIAL

## 2024-06-12 NOTE — TELEPHONE ENCOUNTER
Imaging in PACS and sent to provider for review.     Closing encounter.  Delilah Mcfadden MA on 6/12/2024 at 10:28 AM

## 2024-06-24 ENCOUNTER — ANESTHESIA EVENT (OUTPATIENT)
Dept: SURGERY | Facility: AMBULATORY SURGERY CENTER | Age: 74
End: 2024-06-24
Payer: COMMERCIAL

## 2024-06-25 ENCOUNTER — ANESTHESIA (OUTPATIENT)
Dept: SURGERY | Facility: AMBULATORY SURGERY CENTER | Age: 74
End: 2024-06-25
Payer: COMMERCIAL

## 2024-06-25 ENCOUNTER — HOSPITAL ENCOUNTER (OUTPATIENT)
Facility: AMBULATORY SURGERY CENTER | Age: 74
Discharge: HOME | End: 2024-06-25
Attending: UROLOGY
Payer: COMMERCIAL

## 2024-06-25 VITALS
WEIGHT: 193 LBS | BODY MASS INDEX: 31.02 KG/M2 | TEMPERATURE: 97.9 F | OXYGEN SATURATION: 96 % | SYSTOLIC BLOOD PRESSURE: 148 MMHG | HEIGHT: 66 IN | RESPIRATION RATE: 16 BRPM | DIASTOLIC BLOOD PRESSURE: 79 MMHG

## 2024-06-25 DIAGNOSIS — N43.40 SPERMATOCELE OF EPIDIDYMIS: Primary | ICD-10-CM

## 2024-06-25 DIAGNOSIS — N50.89 MASS OF EPIDIDYMIS: ICD-10-CM

## 2024-06-25 PROCEDURE — G8907 PT DOC NO EVENTS ON DISCHARG: HCPCS

## 2024-06-25 PROCEDURE — 54830 REMOVE EPIDIDYMIS LESION: CPT | Mod: RT | Performed by: UROLOGY

## 2024-06-25 PROCEDURE — 54830 REMOVE EPIDIDYMIS LESION: CPT | Mod: RT

## 2024-06-25 PROCEDURE — 99100 ANES PT EXTEME AGE<1 YR&>70: CPT | Performed by: ANESTHESIOLOGY

## 2024-06-25 PROCEDURE — G8916 PT W IV AB GIVEN ON TIME: HCPCS

## 2024-06-25 PROCEDURE — 88304 TISSUE EXAM BY PATHOLOGIST: CPT | Performed by: PATHOLOGY

## 2024-06-25 PROCEDURE — 54840 REMOVE EPIDIDYMIS LESION: CPT | Performed by: ANESTHESIOLOGY

## 2024-06-25 PROCEDURE — 99100 ANES PT EXTEME AGE<1 YR&>70: CPT | Performed by: NURSE ANESTHETIST, CERTIFIED REGISTERED

## 2024-06-25 PROCEDURE — 54840 REMOVE EPIDIDYMIS LESION: CPT | Performed by: NURSE ANESTHETIST, CERTIFIED REGISTERED

## 2024-06-25 RX ORDER — OXYCODONE HYDROCHLORIDE 5 MG/1
10 TABLET ORAL
Status: DISCONTINUED | OUTPATIENT
Start: 2024-06-25 | End: 2024-06-26 | Stop reason: HOSPADM

## 2024-06-25 RX ORDER — SODIUM CHLORIDE, SODIUM LACTATE, POTASSIUM CHLORIDE, CALCIUM CHLORIDE 600; 310; 30; 20 MG/100ML; MG/100ML; MG/100ML; MG/100ML
INJECTION, SOLUTION INTRAVENOUS CONTINUOUS
Status: DISCONTINUED | OUTPATIENT
Start: 2024-06-25 | End: 2024-06-26 | Stop reason: HOSPADM

## 2024-06-25 RX ORDER — ONDANSETRON 4 MG/1
4 TABLET, ORALLY DISINTEGRATING ORAL EVERY 30 MIN PRN
Status: DISCONTINUED | OUTPATIENT
Start: 2024-06-25 | End: 2024-06-26 | Stop reason: HOSPADM

## 2024-06-25 RX ORDER — LIDOCAINE HYDROCHLORIDE 20 MG/ML
INJECTION, SOLUTION INFILTRATION; PERINEURAL PRN
Status: DISCONTINUED | OUTPATIENT
Start: 2024-06-25 | End: 2024-06-25

## 2024-06-25 RX ORDER — CEFAZOLIN SODIUM 2 G/50ML
2 SOLUTION INTRAVENOUS
Status: DISCONTINUED | OUTPATIENT
Start: 2024-06-25 | End: 2024-06-26 | Stop reason: HOSPADM

## 2024-06-25 RX ORDER — CEFAZOLIN SODIUM 2 G/50ML
2 SOLUTION INTRAVENOUS SEE ADMIN INSTRUCTIONS
Status: DISCONTINUED | OUTPATIENT
Start: 2024-06-25 | End: 2024-06-26 | Stop reason: HOSPADM

## 2024-06-25 RX ORDER — FENTANYL CITRATE 50 UG/ML
25 INJECTION, SOLUTION INTRAMUSCULAR; INTRAVENOUS
Status: DISCONTINUED | OUTPATIENT
Start: 2024-06-25 | End: 2024-06-26 | Stop reason: HOSPADM

## 2024-06-25 RX ORDER — OXYCODONE HYDROCHLORIDE 5 MG/1
5 TABLET ORAL
Status: DISCONTINUED | OUTPATIENT
Start: 2024-06-25 | End: 2024-06-26 | Stop reason: HOSPADM

## 2024-06-25 RX ORDER — ONDANSETRON 2 MG/ML
4 INJECTION INTRAMUSCULAR; INTRAVENOUS EVERY 30 MIN PRN
Status: DISCONTINUED | OUTPATIENT
Start: 2024-06-25 | End: 2024-06-26 | Stop reason: HOSPADM

## 2024-06-25 RX ORDER — ACETAMINOPHEN 325 MG/1
975 TABLET ORAL ONCE
Status: COMPLETED | OUTPATIENT
Start: 2024-06-25 | End: 2024-06-25

## 2024-06-25 RX ORDER — PROPOFOL 10 MG/ML
INJECTION, EMULSION INTRAVENOUS CONTINUOUS PRN
Status: DISCONTINUED | OUTPATIENT
Start: 2024-06-25 | End: 2024-06-25

## 2024-06-25 RX ORDER — DEXAMETHASONE SODIUM PHOSPHATE 4 MG/ML
4 INJECTION, SOLUTION INTRA-ARTICULAR; INTRALESIONAL; INTRAMUSCULAR; INTRAVENOUS; SOFT TISSUE
Status: DISCONTINUED | OUTPATIENT
Start: 2024-06-25 | End: 2024-06-26 | Stop reason: HOSPADM

## 2024-06-25 RX ORDER — FENTANYL CITRATE 50 UG/ML
25 INJECTION, SOLUTION INTRAMUSCULAR; INTRAVENOUS EVERY 5 MIN PRN
Status: DISCONTINUED | OUTPATIENT
Start: 2024-06-25 | End: 2024-06-26 | Stop reason: HOSPADM

## 2024-06-25 RX ORDER — TRAMADOL HYDROCHLORIDE 50 MG/1
50 TABLET ORAL 4 TIMES DAILY PRN
Qty: 20 TABLET | Refills: 0 | Status: SHIPPED | OUTPATIENT
Start: 2024-06-25

## 2024-06-25 RX ORDER — LIDOCAINE 40 MG/G
CREAM TOPICAL
Status: DISCONTINUED | OUTPATIENT
Start: 2024-06-25 | End: 2024-06-26 | Stop reason: HOSPADM

## 2024-06-25 RX ORDER — FENTANYL CITRATE 50 UG/ML
INJECTION, SOLUTION INTRAMUSCULAR; INTRAVENOUS PRN
Status: DISCONTINUED | OUTPATIENT
Start: 2024-06-25 | End: 2024-06-25

## 2024-06-25 RX ORDER — FENTANYL CITRATE 50 UG/ML
50 INJECTION, SOLUTION INTRAMUSCULAR; INTRAVENOUS EVERY 5 MIN PRN
Status: DISCONTINUED | OUTPATIENT
Start: 2024-06-25 | End: 2024-06-26 | Stop reason: HOSPADM

## 2024-06-25 RX ORDER — NALOXONE HYDROCHLORIDE 0.4 MG/ML
0.1 INJECTION, SOLUTION INTRAMUSCULAR; INTRAVENOUS; SUBCUTANEOUS
Status: DISCONTINUED | OUTPATIENT
Start: 2024-06-25 | End: 2024-06-26 | Stop reason: HOSPADM

## 2024-06-25 RX ADMIN — PROPOFOL 125 MCG/KG/MIN: 10 INJECTION, EMULSION INTRAVENOUS at 10:15

## 2024-06-25 RX ADMIN — ACETAMINOPHEN 975 MG: 325 TABLET ORAL at 08:51

## 2024-06-25 RX ADMIN — FENTANYL CITRATE 25 MCG: 50 INJECTION, SOLUTION INTRAMUSCULAR; INTRAVENOUS at 10:32

## 2024-06-25 RX ADMIN — PROPOFOL 20 MG: 10 INJECTION, EMULSION INTRAVENOUS at 10:31

## 2024-06-25 RX ADMIN — FENTANYL CITRATE 25 MCG: 50 INJECTION, SOLUTION INTRAMUSCULAR; INTRAVENOUS at 10:34

## 2024-06-25 RX ADMIN — FENTANYL CITRATE 25 MCG: 50 INJECTION, SOLUTION INTRAMUSCULAR; INTRAVENOUS at 10:16

## 2024-06-25 RX ADMIN — SODIUM CHLORIDE, SODIUM LACTATE, POTASSIUM CHLORIDE, CALCIUM CHLORIDE: 600; 310; 30; 20 INJECTION, SOLUTION INTRAVENOUS at 10:11

## 2024-06-25 RX ADMIN — CEFAZOLIN SODIUM 2 G: 2 SOLUTION INTRAVENOUS at 10:11

## 2024-06-25 RX ADMIN — LIDOCAINE HYDROCHLORIDE 80 MG: 20 INJECTION, SOLUTION INFILTRATION; PERINEURAL at 10:15

## 2024-06-25 RX ADMIN — PROPOFOL 60 MG: 10 INJECTION, EMULSION INTRAVENOUS at 10:17

## 2024-06-25 RX ADMIN — FENTANYL CITRATE 25 MCG: 50 INJECTION, SOLUTION INTRAMUSCULAR; INTRAVENOUS at 10:51

## 2024-06-25 NOTE — ANESTHESIA POSTPROCEDURE EVALUATION
Patient: Boris Zmabrano    Procedure: Procedure(s):  EXCISION, SPERMATOCELE - RIGHT       Anesthesia Type:  MAC    Note:  Disposition: Outpatient   Postop Pain Control: Uneventful            Sign Out: Well controlled pain   PONV: No   Neuro/Psych: Uneventful            Sign Out: Acceptable/Baseline neuro status   Airway/Respiratory: Uneventful            Sign Out: Acceptable/Baseline resp. status   CV/Hemodynamics: Uneventful            Sign Out: Acceptable CV status; No obvious hypovolemia; No obvious fluid overload   Other NRE: NONE   DID A NON-ROUTINE EVENT OCCUR?            Last vitals:  Vitals Value Taken Time   /79 06/25/24 1137   Temp 97.9  F (36.6  C) 06/25/24 1122   Pulse     Resp 16 06/25/24 1137   SpO2 96 % 06/25/24 1137       Electronically Signed By: Camacho Sales MD  June 25, 2024  11:55 AM

## 2024-06-25 NOTE — ANESTHESIA CARE TRANSFER NOTE
Patient: Boris Zambrano    Procedure: Procedure(s):  EXCISION, SPERMATOCELE - RIGHT       Diagnosis: Spermatocele of epididymis [N43.40]  Diagnosis Additional Information: No value filed.    Anesthesia Type:   MAC     Note:    Oropharynx: oropharynx clear of all foreign objects and spontaneously breathing  Level of Consciousness: drowsy  Oxygen Supplementation: room air    Independent Airway: airway patency satisfactory and stable  Dentition: dentition unchanged  Vital Signs Stable: post-procedure vital signs reviewed and stable  Report to RN Given: handoff report given  Patient transferred to: Phase II    Handoff Report: Identifed the Patient, Identified the Reponsible Provider, Reviewed the pertinent medical history, Discussed the surgical course, Reviewed Intra-OP anesthesia mangement and issues during anesthesia, Set expectations for post-procedure period and Allowed opportunity for questions and acknowledgement of understanding    Vitals:  Vitals Value Taken Time   /60 06/25/24 1122   Temp 97.9  F (36.6  C) 06/25/24 1122   Pulse     Resp 16 06/25/24 1122   SpO2 96 % 06/25/24 1122       Electronically Signed By: MINDA Mead CRNA  June 25, 2024  11:23 AM

## 2024-06-25 NOTE — ANESTHESIA PREPROCEDURE EVALUATION
Anesthesia Pre-Procedure Evaluation    Patient: Boris Zambrano   MRN: [id number] : 1950        Procedure : Procedure(s):  EXCISION, SPERMATOCELE - RIGHT          Past Medical History:   Diagnosis Date     BPH with elevated PSA      Calculus of kidney      Cataract of both eyes      De Quervain's disease (tenosynovitis)      Gynecomastia      Hematuria      Hydronephrosis     right     Hypertension      Meniscus, medial, derangement      OCD (obsessive compulsive disorder)      Osteoarthritis      Sciatica, unspecified laterality       Past Surgical History:   Procedure Laterality Date     ARTHROSCOPY KNEE Left     medial meniscus     BIOPSY OF PROSTATE,NEEDLE/PUNCH       CATARACT IOL, RT/LT Bilateral      COLONOSCOPY       CYSTOSCOPY      with right ureteral stent     DAVINCI PROSTATECTOMY N/A 6/15/2022    Procedure: ROBOTIC ASSISTED LAPAROSCOPIC SIMPLE PROSTATECTOMY;  Surgeon: Angel Shaffer MD;  Location: [facility] OR     EMBOLIZATION Right     kidney     PERCUTANEOUS NEPHROLITHOTOMY      with cysto and right stent      No Known Allergies   Social History     Tobacco Use     Smoking status: Never     Smokeless tobacco: Never   Substance Use Topics     Alcohol use: Not Currently      Wt Readings from Last 1 Encounters:   06/15/22 84.1 kg (185 lb 8 oz)        Anesthesia Evaluation            ROS/MED HX  ENT/Pulmonary:       Neurologic:       Cardiovascular:     (+)  hypertension- -   -  - -                                      METS/Exercise Tolerance:     Hematologic:       Musculoskeletal:       GI/Hepatic:       Renal/Genitourinary:       Endo:       Psychiatric/Substance Use:     (+) psychiatric history        Infectious Disease:       Malignancy:       Other:            Physical Exam    Airway        Mallampati: II   TM distance: > 3 FB   Neck ROM: full     Respiratory Devices and Support         Dental       (+) Modest Abnormalities - crowns, retainers, 1 or 2 missing teeth      Cardiovascular          Rhythm  "and rate: regular     Pulmonary           breath sounds clear to auscultation       OUTSIDE LABS:  CBC:   Lab Results   Component Value Date    WBC 12.0 (H) 06/17/2022    WBC 14.9 (H) 06/16/2022    HGB 11.4 (L) 06/17/2022    HGB 12.1 (L) 06/16/2022    HCT 33.9 (L) 06/17/2022    HCT 37.0 (L) 06/16/2022     06/17/2022     06/16/2022     BMP:   Lab Results   Component Value Date     06/17/2022     06/16/2022    POTASSIUM 3.5 06/17/2022    POTASSIUM 4.0 06/16/2022    CHLORIDE 105 06/17/2022    CHLORIDE 106 06/16/2022    CO2 28 06/17/2022    CO2 27 06/16/2022    BUN 13 06/17/2022    BUN 14 06/16/2022    CR 0.99 06/17/2022    CR 1.25 06/16/2022     (H) 06/17/2022    GLC 90 06/17/2022     COAGS: No results found for: \"PTT\", \"INR\", \"FIBR\"  POC: No results found for: \"BGM\", \"HCG\", \"HCGS\"  HEPATIC: No results found for: \"ALBUMIN\", \"PROTTOTAL\", \"ALT\", \"AST\", \"GGT\", \"ALKPHOS\", \"BILITOTAL\", \"BILIDIRECT\", \"DISHA\"  OTHER:   Lab Results   Component Value Date    SAMUEL 8.7 06/17/2022       Anesthesia Plan    ASA Status:  2       Anesthesia Type: MAC.     - Reason for MAC: immobility needed              Consents    Anesthesia Plan(s) and associated risks, benefits, and realistic alternatives discussed. Questions answered and patient/representative(s) expressed understanding.     - Discussed:     - Discussed with:  Patient            Postoperative Care    Pain management: Multi-modal analgesia.   PONV prophylaxis: Ondansetron (or other 5HT-3)     Comments:             Camacho Sales MD    I have reviewed the pertinent notes and labs in the chart from the past 30 days and (re)examined the patient.  Any updates or changes from those notes are reflected in this note.                  "

## 2024-06-25 NOTE — OP NOTE
OPERATIVE REPORT  DATE OF SURGERY: 06/25/24  LOCATION OF SURGERY: [facility]  PREOPERATIVE DIAGNOSIS:  (N43.40) Spermatocele of epididymis  (primary encounter diagnosis)  (N50.89) Mass of epididymis  POSTOPERATIVE DIAGNOSIS: (N43.40) Spermatocele of epididymis  (primary encounter diagnosis)  (N50.89) Mass of epididymis     START TIME: 10:31 AM  END TIME: 11:15 AM    PROCEDURE PERFORMED:   Right scrotal exploration  Excision of Right epididymal mass      STAFF SURGEON: Angel Shaffer MD  ANESTHESIA: MAC.   ESTIMATED BLOOD LOSS: 2 mL.   DRAINS AND TUBES: None  COMPLICATIONS: None.   DISPOSITION: PACU.   SPECIMENS OBTAINED:   ID Type Source Tests Collected by Time Destination   1 : Right Epididymal mass Tissue Epididymis SURGICAL PATHOLOGY EXAM Angel Shaffer MD 6/25/2024 10:55 AM       SIGNIFICANT FINDINGS: Firm mass of the head of the epididymis which was removed.     HISTORY OF PRESENT ILLNESS: Boris Zambrano is a 74 year old man with recent episode of right testicular pain.  Testicular pain with possible right epididymitis with a tender paratesticular cyst/nodule likely within the head of the epididymis.  He was counseled on treatment options and elects to proceed with the above surgery.    OPERATION PERFORMED:   Informed consent was obtained and the patient was brought to the operating room where anesthesia was induced. The patient was given appropriate preoperative antibiotics and positioned supine. We then performed a timeout, verifying the correct patient's site and procedure to be performed.    A 2cm incision was made in the right hemiscrotum.  This was carried down through the layer of the dartos with electrocautery.  The tunica vaginalis was identified and incised and the testicle and epididymis were delivered into the field.  There was a firm nodule easily palpated within the head of the epididymis.  This was carefully dissected off the posterior aspect of the testicle with care to avoid any damage to  the testicular artery.  This mass was adherent to the vas deferens which was divided.  The mass was removed in entirety.  3-0 chromic suture ligature was placed through the vas deferens.  Hemostasis was ensured and noted to be excellent with electrocautery.  The testicle was replaced in the right hemiscrotum and the tunica vaginalis was closed with 3-0 chromic.  The dartos layer was then closed with 3-0 Vicryl and the skin was reapproximated with 4-0 Monocryl and covered with skin glue.    He was emerged from anesthesia and taken to the recovery room in stable condition.      Angel Shaffer MD   Urology  [facility] Phone [phone]

## 2024-06-25 NOTE — DISCHARGE INSTRUCTIONS
POSTOPERATIVE INSTRUCTIONS    Diagnosis-------------------------------   Right spermatocele versus nodule of the epididymis    Procedure-------------------------------  Procedure(s) (LRB):  EXCISION, SPERMATOCELE - RIGHT (Right)      Findings--------------------------------  Firm nodule within the head of the epididymis excised    Home-going instructions-----------------         Recovering at home  Follow the instructions you have been given to care for yourself. During your recovery:  Apply an ice pack or cold compress to the scrotum as directed to help reduce swelling. Do this for no longer than 15 minutes at a time. Continue using the cold pack for 2 days or until swelling improves.  You may shower Saturday  Avoid swimming, bathing, using a hot tub, and other activities that cause the incision to be covered with water for 2 weeks  Wear a jockstrap or snug underwear as directed.  Don't lift anything heavy for 2 weeks.   Exercise starting slowly in 2-3 weeks  Don't have sex for 4 weeks, or as directed.    Discharge Medications/instructions:   - Take Tylenol 1000mg every 6 hours for pain  - Take Ibuprofen 600mg every 6 hours as needed for additional pain control - ideally not in the first 2 days  - Take Tramadol every 4-6 hours only for break through pain      When to call your healthcare provider  Call your healthcare provider if you have any of the following:  Chest pain or trouble breathing (call [phone])  Fever of 100.4 F (38 C) or higher, or as directed by your healthcare provider  Symptoms of infection at the incision site such as increased redness or swelling, warmth, worsening pain, or foul-smelling drainage  Bleeding from the incision site  Pain gets worse or is not relieved by pain medicine  Increased pain or swelling in the scrotum or groin area           Questions/concerns------------------------  [facility]: [phone]    Future appointments  You are scheduled for follow-up with   Edmund on 7/11/2024    Angel Shaffer MD       You had 975 mg of Tylenol at 0850. You may repeat this after 2:50. Maximum amount of Tylenol/Acetaminophen in a 24 hour period is 4,000 mg.

## 2024-06-27 ENCOUNTER — CARE COORDINATION (OUTPATIENT)
Dept: UROLOGY | Facility: CLINIC | Age: 74
End: 2024-06-27
Payer: COMMERCIAL

## 2024-06-27 LAB
PATH REPORT.COMMENTS IMP SPEC: NORMAL
PATH REPORT.COMMENTS IMP SPEC: NORMAL
PATH REPORT.FINAL DX SPEC: NORMAL
PATH REPORT.GROSS SPEC: NORMAL
PATH REPORT.RELEVANT HX SPEC: NORMAL
PHOTO IMAGE: NORMAL

## 2024-06-28 ENCOUNTER — TELEPHONE (OUTPATIENT)
Dept: UROLOGY | Facility: CLINIC | Age: 74
End: 2024-06-28
Payer: COMMERCIAL

## 2024-06-28 NOTE — TELEPHONE ENCOUNTER
Boris Zambrano  [id number]  June 28, 2024  5:22 PM     I returned a call to the patient who is overall in good spirits and doing well.  He notes that the swelling is improving slightly and we discussed the continued use of scrotal support and frequent icing.  He has not required any pain medication with tramadol.    Angel Shaffer M.D.

## 2024-06-28 NOTE — TELEPHONE ENCOUNTER
[facility]    Phone Message    May a detailed message be left on voicemail: yes     Reason for Call: Other: Pt had surgery recently, and has swelling and enlarged scrotal. Pt is concerned and wants a call back. Please call pt back.Thanks.     Action Taken: Other: [facility]    Travel Screening: Not Applicable     Date of Service:

## 2024-06-28 NOTE — TELEPHONE ENCOUNTER
"Called and spoke to patient who reports scrotal swelling. Patient stated, \"I am not used to this type of stuff, but my whole area was so swollen at one point where my penis was like a turtle. The swelling is a bit better now. My penis is out and better somewhat now.\" Patient reports that currently the left testicle is swollen slightly but he can feel his left testicle okay. Patient stated, \"My right side is swollen, but coming down a bit and feels hard. There is a little bit of bruising there but that is probably expected.\"     Patient denies pain and reports taking tylenol. Per patient, he has not had to take any tramadol. Patient denies fevers and reports that he is able to urinate without difficulty.     Per patient, he had iced the area some and stated, \"I probably didn't do the best at that.\" Patient reports that he has been wearing the jock strap since after surgery and it seems to be okay.    Encouraged patient to ice 15 minutes on and 15 minutes off with a layer of cloth between his skin and the ice pack, continue to wear scrotal support, and he could try resting with a washcloth folded up under his scrotum for additional support and elevated.     Informed patient that some swelling is common after this procedure, however if he is doing the ice, elevation, and scrotal support and no improvement in swelling, if he has a sudden increase in swelling, increased bruising, severe pain, or inability to urinate then he should seek care at an ER for further evaluation.     Informed patient that Dr. Shaffer is in the OR currently but a message will be sent to update him as well. Informed patient that he will be contacted if additional recommendations if we receive an update before 5pm.     Informed patient that a my chart message with a telephone number to a triage nurse will also be sent.    Patient was comfortable with plan.    Peg Rosenbaum RN, BSN        "

## 2024-07-11 ENCOUNTER — OFFICE VISIT (OUTPATIENT)
Dept: UROLOGY | Facility: CLINIC | Age: 74
End: 2024-07-11
Payer: COMMERCIAL

## 2024-07-11 ENCOUNTER — MYC MEDICAL ADVICE (OUTPATIENT)
Dept: UROLOGY | Facility: CLINIC | Age: 74
End: 2024-07-11

## 2024-07-11 DIAGNOSIS — N43.40 SPERMATOCELE OF EPIDIDYMIS: Primary | ICD-10-CM

## 2024-07-11 PROCEDURE — 99024 POSTOP FOLLOW-UP VISIT: CPT | Performed by: UROLOGY

## 2024-07-11 NOTE — NURSING NOTE
Boris Zambrano's goals for this visit include:   Chief Complaint   Patient presents with    RECHECK     2 week post-op, DOS 6/25/24 EXCISION, SPERMATOCELE - RIGHT, location verified, imaging in PACS       He requests these members of his care team be copied on today's visit information:     PCP: Boris Rowland    Referring Provider:  Referred Self, MD  No address on file    There were no vitals taken for this visit.    Do you need any medication refills at today's visit?     Delilah Mcfadden MA on 7/11/2024 at 9:41 AM

## 2024-07-11 NOTE — PROGRESS NOTES
"[facility]  CHIEF COMPLAINT   It was my pleasure to see Boris Zambrano who is a 74 year old male for follow-up of Elevated PSA, gross hematuria.      HPI   Boris Zambrano is a very pleasant 74 year old male   Last saw Dr. Gonzalez - 1/19/22:  Boris Zambrano is a 71 year old male being seen for follow-up urology issues.     History of BPH symptoms and elevated PSA.  Prostate MRI was pirads 2.     PCNL 2017: history of staghorn calculus s/p PCNL at [facility] with Dr. 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       He has trouble postponing, but can void well at this time.    Has had prostate biopsy several times, these were negative.  He has been on over-the-counter \"Prostagenix\" , feels this helps his lower urinary tract symptoms.     1/2/22- he voided, noted gross painless hematuria. Urine now clear.     In the past took tamsulosin 0.4mg QD.- not taking now.    2/10/22:  His biggest issues is urgency  He drinks gatorade, water, and 1 cup of coffee  He does drink about 1 gallon in total - for kidney stone prevention  He did have an episode of gross hematuria in January    Has had 4 biopsies - he reports last biopsy in 1980s    3/3/22:  Follow-up today for cystoscopy and to review his prostate MRI and CT urogram  He notes continued bothersome urination and is very hopeful that this can be fixed    4/28/2022:  Follow-up today to review his biopsy results  He did well after the biopsy with no issues  He continues to be very bothered by his urination    6/28/2022:  He is status post a robotic simple prostatectomy on 6/15/2022  He did well in the postoperative period with no complications  He returns today for Dooley catheter removal    9/29/2022:  \"I couldn't be happier\"  He is urinating very well  He did PT and is not having any issues  He is no longer needing any pads  He is sleeping through the " night    10/3/2023:  He is doing very well from a urinary standpoint    Had a bad bout of COVID in March  During that time his right testicle was painful and enlarged   This then resolved  He has noted intermittent episodes of right testicular discomfort    6/6/2024:  Had an episode of right abd/flank/testicular pain on 5/2/2024  U/S with possible right epididymoorchitis  Prescribed levofloxacin - but only took one pill and developed significant headache  Went back to ER on 5/3 and changed to doxycycline    6/25/2024:  Excision spermatocele    TODAY 7/11/2024:  Follow-up today for postop check  He notes that he is doing extremely well with complete resolution of his prior right testicular and groin pain  Some firmness of the right hemiscrotum, but nontender      PHYSICAL EXAM  Patient is a 74 year old  male   Vitals: There were no vitals taken for this visit.  There is no height or weight on file to calculate BMI.  General Appearance Adult:   Alert, no acute distress, oriented  HENT: throat/mouth:normal, good dentition  Lungs: no respiratory distress, or pursed lip breathing  Heart: No obvious jugular venous distension present  Abdomen: soft, nontender, no organomegaly or masses  : Normal phallus, normal left testis and epididymis, right testis slightly firm consistent with a likely surrounding hematoma.  Skin is well-healed  Musculoskeltal: extremities normal, no peripheral edema  Skin: no suspicious lesions or rashes  Neuro: Alert, oriented, speech and mentation normal  Psych: affect and mood normal  Gait: Normal    PSA history  (from old records)   04/2014           7.71  04/2015           11.64  05/2017           11.08  07/2018           11.6  08/2019           16.19  02/2020           14.7  8/2020             13.24  11/2020           13.0  10/2021           17.04  9/29/2022  0.61    CR history:  1/7/2021 1.20  10/22/2021 1.31  2/17/2021 1.27  Creatinine   Date Value Ref Range Status   06/17/2022 0.99 0.66 -  1.25 mg/dL Final        PATHOLOGY:  4/21/22:  Final Diagnosis   A. - L.  Prostate, biopsy:  - Benign prostatic tissue      6/15/2022:  Final Diagnosis   Prostate, adenoma, simple prostatectomy:  - Benign prostatic hyperplasia (BPH).  - 81.1 g     6/25/2024:  Final Diagnosis   A. Right Epididymal mass:  -  Epididymal cyst with squamous metaplasia, fibrosis and inflammation       IMAGING:  All pertinent imaging reviewed:    All imaging studies reviewed by me.  I personally reviewed these imaging films.  A formal report from radiology will follow.    MRI PROSTATE 3/1/22:  FINDINGS:  Size: 4.7 x 5.9 x 7.3 cm, 105.3 grams  Hemorrhage: Absent  Peripheral zone: Heterogeneous on T2-weighted images. Regions of  mildly decreased signal on ADC or DWI which are best characterized as  PI-RADS 2 without highly suspicious lesion.  Transition zone: Enlarged with BPH changes. Transition zone nodules  which are circumscribed or mostly encapsulated without diffusion  restriction.  PI-RADS 2.  No highly suspicious nodules.     Neurovascular bundles: No suspicion of involvement by malignancy  Seminal vesicles: Not involved by tumor.  Lymph nodes: Two sub-5 mm lymph nodes posterior to the bilateral  seminal vesicles (image [id number] and 33), unchanged since 2/22/2020.  Bones: No suspicious lesions. Degenerative changes of the visualized  cervical spine.  Other pelvic organs: Colonic diverticulosis. Small fat-containing left  inguinal hernia.                                                               IMPRESSION:  1. Based on the most suspicious abnormality, this exam is  characterized as PIRADS 2 - Clinically significant cancer is unlikely  to be present.?  2. Marked prostatomegaly.  3. Colonic diverticulosis.        CT 3/1/22:  IMPRESSION:   1. CT urogram demonstrates no evidence of upper urinary tract  urothelial malignancy or urinary tract stone.  2. Stable embolization material within the lower pole of the right  kidney.  3.  Intermediate density (35 HU), nonenhancing cyst arising from the  left mid kidney is unchanged compared to 2/22/2020, compatible with a  hemorrhagic/proteinaceous cyst.   4. Prostatomegaly.    TESTICULAR U/S 9/26/2023:  Findings:  The testes demonstrate normal and symmetric vascularity.  Normal left  testicular echotexture.  Asymmetric heterogeneous right testicle  without discrete lesion. The right testicle measures 4.2 x 1.8 x 2.9  cm and the left measures 3.9 x 2.2 x 2.6 cm. There is no evidence of  torsion.  There is no evidence of a significant hydrocele or varicocele.  Both the right and left epididymis are within normal limits.                                                             Impression:   1. Heterogeneous right testicle without discrete lesion.  Urology  consultation is recommended to guide further management.  2. Normal testicular blood flow.     U/S TESTICULAR 5/2/2024:  IMPRESSION:   1. Findings compatible with right epididymoorchitis.   2.  Normal left testicle.   3.  Small bilateral hydroceles.     CT ABD/PEL 5/2/2024:  IMPRESSION:    1.  No acute abnormality in the abdomen or pelvis on this noncontrast exam.   2.  Colonic diverticulosis without diverticulitis.   3.  Cholelithiasis.     ASSESSMENT and PLAN  74-year-old man with history of marked prostatic hypertrophy, BPH, gross hematuria and elevated PSA with prior history of kidney stones.  Now status post a robotic simple prostatectomy on 6/15/2022.  Now having right scrotal discomfort secondary to a firm spermatocele now status post excision of the spermatocele on 6/25/2024 with pathology showing epididymal cyst with squamous metaplasia and fibrosis    Right scrotal discomfort with a paratesticular cyst versus mass  -this has completely resolved following his surgical excision and he is very happy with the outcome  - We discussed that the slight swelling and firmness of the right hemiscrotum will gradually resolve with time  - Reviewed  his pathology with no evidence of malignancy and no additional follow-up is needed    History of BPH with LUTS  -He is doing extremely well after his robotic simple prostatectomy  - He is urinating extremely well and is very happy with the outcome    He may follow-up with me on a as needed basis    Time spent: 15 minutes spent on the date of the encounter doing chart review, history and exam, documentation and further activities as noted above.      Angel Shaffer MD   Urology  [facility]  [facility] Phone: [phone]  [facility] Phone: [phone]

## 2025-01-15 ENCOUNTER — MEDICAL CORRESPONDENCE (OUTPATIENT)
Dept: HEALTH INFORMATION MANAGEMENT | Facility: CLINIC | Age: 75
End: 2025-01-15

## 2025-02-04 RX ORDER — VITAMIN B COMPLEX
1 TABLET ORAL DAILY
COMMUNITY

## 2025-02-04 NOTE — PROGRESS NOTES
PTA medications updated by Medication Scribe prior to surgery via phone call with patient (last doses completed by Nurse)     Medication history sources: Patient, Surescripts, and H&P  In the past week, patient estimated taking medication this percent of the time: Greater than 90%      Significant changes made to the medication list:  None      Additional medication history information:   None    Medication reconciliation completed by provider prior to medication history? No    Time spent in this activity: 30 MINUTES    The information provided in this note is only as accurate as the sources available at the time of update(s)      Prior to Admission medications    Medication Sig Last Dose Taking? Auth Provider Long Term End Date   atorvastatin (LIPITOR) 10 MG tablet Take 10 mg by mouth at bedtime. Bedtime Yes Reported, Patient Yes    clonazePAM (KLONOPIN) 1 MG tablet Take 1 tablet by mouth 2 times daily. Evening Yes Marques Fisher MD     cycloSPORINE (RESTASIS) 0.05 % ophthalmic emulsion Place 1 drop into both eyes 2 times daily Evening Yes Reported, Patient     FLUoxetine (PROZAC) 20 MG capsule Take 40 mg by mouth 2 times daily (20 MG X 2 = 40 MG) Evening Yes Reported, Patient Yes    lisinopril (ZESTRIL) 10 MG tablet Take 1 tablet by mouth daily Morning Yes Reported, Patient Yes    Multiple Vitamins-Minerals (MULTIVITAMIN MEN 50+) TABS Take 1 tablet by mouth daily.  Yes Reported, Patient     Vitamin D3 (VITAMIN D, CHOLECALCIFEROL,) 25 mcg (1000 units) tablet Take 1 tablet by mouth daily.  Yes Reported, Patient     traMADol (ULTRAM) 50 MG tablet Take 1 tablet (50 mg) by mouth 4 times daily as needed for pain (PRN)   Angel Shaffer MD         Medication history completed by: Charley Medeiros

## 2025-02-09 ENCOUNTER — ANESTHESIA EVENT (OUTPATIENT)
Dept: SURGERY | Facility: CLINIC | Age: 75
End: 2025-02-09
Payer: COMMERCIAL

## 2025-02-09 NOTE — ANESTHESIA PREPROCEDURE EVALUATION
Anesthesia Pre-Procedure Evaluation    Patient: Boris Zambrano   MRN: [id number] : 1950        Procedure : Procedure(s):  Cervical 5 to cervical 7 anterior cervical discectomy and fusion          Past Medical History:   Diagnosis Date    BPH with urinary obstruction     Calculus of kidney     Cataract of both eyes     De Quervain's disease (tenosynovitis)     Duodenal ulcer     Elevated prostate specific antigen (PSA)     Essential hypertension     JOANNA (generalized anxiety disorder)     Gynecomastia     Hematuria     Hydronephrosis     right    Hyperlipidemia     Meniscus, medial, derangement     Nephrolithiasis     OCD (obsessive compulsive disorder)     Osteoarthritis of carpometacarpal (CMC) joint of right thumb     PCO (posterior capsular opacification), bilateral     Primary osteoarthritis of left knee     Sciatica, unspecified laterality       Past Surgical History:   Procedure Laterality Date    BIOPSY OF PROSTATE,NEEDLE/PUNCH      BIOPSY PROSTATE      CATARACT EXTRACTION W/ INTRAOCULAR LENS IMPLANT Bilateral 2018    CATARACT IOL, RT/LT Bilateral     COLONOSCOPY      CYSTOSCOPY      with right ureteral stent    CYSTOSCOPY RIGHT URETERAL STENT EXCHANGE RIGHT RETROGRADES CLOT EVACUATION AND FULGURATION  2017    CYSTOSCOPY, RIGHT STENT EXCHANGE.,CLOT EVACUATION Right 2017    DAVINCI PROSTATECTOMY N/A 06/15/2022    Procedure: ROBOTIC ASSISTED LAPAROSCOPIC SIMPLE PROSTATECTOMY;  Surgeon: Angel Shaffer MD;  Location: [facility] OR    EPIDIDYMAL CYST EXCISION Right 2024    IR EMBOLIZATION, KIDNEY Right 2017    KNEE ARTHROSCOPY, MEDIAL MENISCUS Left 2012    MEDIAL MENISCUS Left     PERCUTANEOUS NEPHROLITHOTOMY      with cysto and right stent    PROSTATECTOMY  06/15/2022    RIGHT PERCUTANEOUS NEPHROLITHOTOMY, CYSTOSCOPY AND RIGHT STENT PLACEMENT Right 2017    SPERMATOCELECTOMY Right 2024    Procedure: EXCISION, SPERMATOCELE - RIGHT;  Surgeon: Angel Shaffer MD;   Location: [facility] OR    TOTAL KNEE ARTHROPLASTY Left 09/30/2024    TRANSRECTAL ULTRASOUND SATURATION BIOPSIES OF PROSTATE  07/2013    WISDOM TEETH EXTRACTION         Allergies   Allergen Reactions    Levofloxacin Headache    Doxycycline Headache      Social History     Tobacco Use    Smoking status: Never    Smokeless tobacco: Never   Substance Use Topics    Alcohol use: Not Currently      Wt Readings from Last 1 Encounters:   06/25/24 87.5 kg (193 lb)        Anesthesia Evaluation            ROS/MED HX  ENT/Pulmonary:    (-) tobacco use, asthma, COPD and sleep apnea   Neurologic: Comment: Cervical radiculopathy   (-) no seizures and no CVA   Cardiovascular:     (+) Dyslipidemia hypertension- -   -  - -                                   (-) CAD and arrhythmias   METS/Exercise Tolerance:     Hematologic:       Musculoskeletal:       GI/Hepatic: Comment: Duodenal ulcer   (-) GERD and liver disease   Renal/Genitourinary:     (+)        BPH,   (-) renal disease   Endo:     (+)               Obesity,    (-) Type I DM and Type II DM   Psychiatric/Substance Use:     (+) psychiatric history (OCD) anxiety       Infectious Disease:       Malignancy:       Other:            Physical Exam    Airway        Mallampati: II   TM distance: > 3 FB   Neck ROM: limited   Mouth opening: > 3 cm    Respiratory Devices and Support         Dental  no notable dental history     (+) Minor Abnormalities - some fillings, tiny chips      Cardiovascular          Rhythm and rate: regular     Pulmonary           breath sounds clear to auscultation           OUTSIDE LABS:  CBC:   Lab Results   Component Value Date    WBC 12.0 (H) 06/17/2022    WBC 14.9 (H) 06/16/2022    HGB 11.4 (L) 06/17/2022    HGB 12.1 (L) 06/16/2022    HCT 33.9 (L) 06/17/2022    HCT 37.0 (L) 06/16/2022     06/17/2022     06/16/2022     BMP:   Lab Results   Component Value Date     06/17/2022     06/16/2022    POTASSIUM 3.5 06/17/2022    POTASSIUM 4.0  "06/16/2022    CHLORIDE 105 06/17/2022    CHLORIDE 106 06/16/2022    CO2 28 06/17/2022    CO2 27 06/16/2022    BUN 13 06/17/2022    BUN 14 06/16/2022    CR 0.99 06/17/2022    CR 1.25 06/16/2022     (H) 06/17/2022    GLC 90 06/17/2022     COAGS: No results found for: \"PTT\", \"INR\", \"FIBR\"  POC: No results found for: \"BGM\", \"HCG\", \"HCGS\"  HEPATIC: No results found for: \"ALBUMIN\", \"PROTTOTAL\", \"ALT\", \"AST\", \"GGT\", \"ALKPHOS\", \"BILITOTAL\", \"BILIDIRECT\", \"DISHA\"  OTHER:   Lab Results   Component Value Date    SAMUEL 8.7 06/17/2022       Anesthesia Plan    ASA Status:  2       Anesthesia Type: General.     - Airway: ETT   Induction: Intravenous, Propofol.   Maintenance: Balanced.   Techniques and Equipment:     - Airway: Video-Laryngoscope       Consents    Anesthesia Plan(s) and associated risks, benefits, and realistic alternatives discussed. Questions answered and patient/representative(s) expressed understanding.     - Discussed:     - Discussed with:  Patient            Postoperative Care    Pain management: Multi-modal analgesia.   PONV prophylaxis: Ondansetron (or other 5HT-3), Dexamethasone or Solumedrol     Comments:               Juancho Barry MD    I have reviewed the pertinent notes and labs in the chart from the past 30 days and (re)examined the patient.  Any updates or changes from those notes are reflected in this note.    Clinically Significant Risk Factors Present on Admission                   # Hypertension: Noted on problem list                        "

## 2025-02-10 ENCOUNTER — HOSPITAL ENCOUNTER (OUTPATIENT)
Facility: CLINIC | Age: 75
Discharge: HOME | End: 2025-02-11
Attending: ORTHOPAEDIC SURGERY | Admitting: ORTHOPAEDIC SURGERY
Payer: COMMERCIAL

## 2025-02-10 ENCOUNTER — ANESTHESIA (OUTPATIENT)
Dept: SURGERY | Facility: CLINIC | Age: 75
End: 2025-02-10
Payer: COMMERCIAL

## 2025-02-10 ENCOUNTER — APPOINTMENT (OUTPATIENT)
Dept: GENERAL RADIOLOGY | Facility: CLINIC | Age: 75
End: 2025-02-10
Attending: ORTHOPAEDIC SURGERY
Payer: COMMERCIAL

## 2025-02-10 DIAGNOSIS — Z98.1 S/P CERVICAL SPINAL FUSION: Primary | ICD-10-CM

## 2025-02-10 LAB
ABO + RH BLD: NORMAL
BLD GP AB SCN SERPL QL: NEGATIVE
FASTING STATUS PATIENT QL REPORTED: YES
GLUCOSE SERPL-MCNC: 93 MG/DL (ref 70–99)
POTASSIUM SERPL-SCNC: 4.1 MMOL/L (ref 3.4–5.3)
SPECIMEN EXP DATE BLD: NORMAL

## 2025-02-10 PROCEDURE — 250N000009 HC RX 250: Performed by: ORTHOPAEDIC SURGERY

## 2025-02-10 PROCEDURE — 250N000009 HC RX 250: Performed by: NURSE ANESTHETIST, CERTIFIED REGISTERED

## 2025-02-10 PROCEDURE — 370N000017 HC ANESTHESIA TECHNICAL FEE, PER MIN: Performed by: ORTHOPAEDIC SURGERY

## 2025-02-10 PROCEDURE — 258N000003 HC RX IP 258 OP 636: Performed by: STUDENT IN AN ORGANIZED HEALTH CARE EDUCATION/TRAINING PROGRAM

## 2025-02-10 PROCEDURE — C1713 ANCHOR/SCREW BN/BN,TIS/BN: HCPCS | Performed by: ORTHOPAEDIC SURGERY

## 2025-02-10 PROCEDURE — 250N000011 HC RX IP 250 OP 636: Performed by: NURSE ANESTHETIST, CERTIFIED REGISTERED

## 2025-02-10 PROCEDURE — 86900 BLOOD TYPING SEROLOGIC ABO: CPT | Performed by: ANESTHESIOLOGY

## 2025-02-10 PROCEDURE — 999N000141 HC STATISTIC PRE-PROCEDURE NURSING ASSESSMENT: Performed by: ORTHOPAEDIC SURGERY

## 2025-02-10 PROCEDURE — 999N000179 XR SURGERY CARM FLUORO LESS THAN 5 MIN W STILLS

## 2025-02-10 PROCEDURE — 250N000011 HC RX IP 250 OP 636: Performed by: ORTHOPAEDIC SURGERY

## 2025-02-10 PROCEDURE — 250N000013 HC RX MED GY IP 250 OP 250 PS 637: Performed by: ORTHOPAEDIC SURGERY

## 2025-02-10 PROCEDURE — 250N000013 HC RX MED GY IP 250 OP 250 PS 637: Performed by: STUDENT IN AN ORGANIZED HEALTH CARE EDUCATION/TRAINING PROGRAM

## 2025-02-10 PROCEDURE — 272N000001 HC OR GENERAL SUPPLY STERILE: Performed by: ORTHOPAEDIC SURGERY

## 2025-02-10 PROCEDURE — 250N000025 HC SEVOFLURANE, PER MIN: Performed by: ORTHOPAEDIC SURGERY

## 2025-02-10 PROCEDURE — 84132 ASSAY OF SERUM POTASSIUM: CPT | Performed by: ANESTHESIOLOGY

## 2025-02-10 PROCEDURE — 82947 ASSAY GLUCOSE BLOOD QUANT: CPT | Performed by: ANESTHESIOLOGY

## 2025-02-10 PROCEDURE — 710N000009 HC RECOVERY PHASE 1, LEVEL 1, PER MIN: Performed by: ORTHOPAEDIC SURGERY

## 2025-02-10 PROCEDURE — 360N000085 HC SURGERY LEVEL 5 W/ FLUORO, PER MIN: Performed by: ORTHOPAEDIC SURGERY

## 2025-02-10 PROCEDURE — 258N000003 HC RX IP 258 OP 636: Performed by: ANESTHESIOLOGY

## 2025-02-10 PROCEDURE — 36415 COLL VENOUS BLD VENIPUNCTURE: CPT | Performed by: ANESTHESIOLOGY

## 2025-02-10 PROCEDURE — 250N000011 HC RX IP 250 OP 636: Performed by: ANESTHESIOLOGY

## 2025-02-10 PROCEDURE — 272N000002 HC OR SUPPLY OTHER OPNP: Performed by: ORTHOPAEDIC SURGERY

## 2025-02-10 PROCEDURE — 250N000011 HC RX IP 250 OP 636: Performed by: STUDENT IN AN ORGANIZED HEALTH CARE EDUCATION/TRAINING PROGRAM

## 2025-02-10 PROCEDURE — 258N000003 HC RX IP 258 OP 636: Performed by: NURSE ANESTHETIST, CERTIFIED REGISTERED

## 2025-02-10 DEVICE — MAGNETOS EASYPACK PUTTY 1.5CC 1-2MM USA
Type: IMPLANTABLE DEVICE | Site: SPINE CERVICAL | Status: FUNCTIONAL
Brand: MAGNETOS

## 2025-02-10 DEVICE — IMPLANTABLE DEVICE: Type: IMPLANTABLE DEVICE | Site: SPINE CERVICAL | Status: FUNCTIONAL

## 2025-02-10 RX ORDER — CEFAZOLIN SODIUM/WATER 2 G/20 ML
2 SYRINGE (ML) INTRAVENOUS SEE ADMIN INSTRUCTIONS
Status: DISCONTINUED | OUTPATIENT
Start: 2025-02-10 | End: 2025-02-10 | Stop reason: HOSPADM

## 2025-02-10 RX ORDER — HYDROMORPHONE HCL IN WATER/PF 6 MG/30 ML
0.2 PATIENT CONTROLLED ANALGESIA SYRINGE INTRAVENOUS EVERY 4 HOURS PRN
Status: DISCONTINUED | OUTPATIENT
Start: 2025-02-10 | End: 2025-02-11 | Stop reason: HOSPADM

## 2025-02-10 RX ORDER — NALOXONE HYDROCHLORIDE 0.4 MG/ML
0.2 INJECTION, SOLUTION INTRAMUSCULAR; INTRAVENOUS; SUBCUTANEOUS
Status: DISCONTINUED | OUTPATIENT
Start: 2025-02-10 | End: 2025-02-11 | Stop reason: HOSPADM

## 2025-02-10 RX ORDER — NALOXONE HYDROCHLORIDE 0.4 MG/ML
0.4 INJECTION, SOLUTION INTRAMUSCULAR; INTRAVENOUS; SUBCUTANEOUS
Status: DISCONTINUED | OUTPATIENT
Start: 2025-02-10 | End: 2025-02-11 | Stop reason: HOSPADM

## 2025-02-10 RX ORDER — GABAPENTIN 100 MG/1
100 CAPSULE ORAL
Status: COMPLETED | OUTPATIENT
Start: 2025-02-10 | End: 2025-02-10

## 2025-02-10 RX ORDER — HYDROMORPHONE HCL IN WATER/PF 6 MG/30 ML
0.4 PATIENT CONTROLLED ANALGESIA SYRINGE INTRAVENOUS EVERY 5 MIN PRN
Status: DISCONTINUED | OUTPATIENT
Start: 2025-02-10 | End: 2025-02-10 | Stop reason: HOSPADM

## 2025-02-10 RX ORDER — HYDROMORPHONE HCL IN WATER/PF 6 MG/30 ML
0.4 PATIENT CONTROLLED ANALGESIA SYRINGE INTRAVENOUS EVERY 4 HOURS PRN
Status: DISCONTINUED | OUTPATIENT
Start: 2025-02-10 | End: 2025-02-11 | Stop reason: HOSPADM

## 2025-02-10 RX ORDER — DEXAMETHASONE SODIUM PHOSPHATE 4 MG/ML
INJECTION, SOLUTION INTRA-ARTICULAR; INTRALESIONAL; INTRAMUSCULAR; INTRAVENOUS; SOFT TISSUE PRN
Status: DISCONTINUED | OUTPATIENT
Start: 2025-02-10 | End: 2025-02-10

## 2025-02-10 RX ORDER — MAGNESIUM HYDROXIDE/ALUMINUM HYDROXICE/SIMETHICONE 120; 1200; 1200 MG/30ML; MG/30ML; MG/30ML
30 SUSPENSION ORAL EVERY 4 HOURS PRN
Status: DISCONTINUED | OUTPATIENT
Start: 2025-02-10 | End: 2025-02-11 | Stop reason: HOSPADM

## 2025-02-10 RX ORDER — ATORVASTATIN CALCIUM 10 MG/1
10 TABLET, FILM COATED ORAL AT BEDTIME
Status: DISCONTINUED | OUTPATIENT
Start: 2025-02-10 | End: 2025-02-11 | Stop reason: HOSPADM

## 2025-02-10 RX ORDER — CEFAZOLIN SODIUM 2 G/100ML
2 INJECTION, SOLUTION INTRAVENOUS EVERY 8 HOURS
Status: COMPLETED | OUTPATIENT
Start: 2025-02-10 | End: 2025-02-10

## 2025-02-10 RX ORDER — VECURONIUM BROMIDE 1 MG/ML
INJECTION, POWDER, LYOPHILIZED, FOR SOLUTION INTRAVENOUS PRN
Status: DISCONTINUED | OUTPATIENT
Start: 2025-02-10 | End: 2025-02-10

## 2025-02-10 RX ORDER — ONDANSETRON 2 MG/ML
4 INJECTION INTRAMUSCULAR; INTRAVENOUS EVERY 6 HOURS PRN
Status: DISCONTINUED | OUTPATIENT
Start: 2025-02-10 | End: 2025-02-11 | Stop reason: HOSPADM

## 2025-02-10 RX ORDER — ACETAMINOPHEN 325 MG/1
975 TABLET ORAL EVERY 8 HOURS
Status: DISCONTINUED | OUTPATIENT
Start: 2025-02-10 | End: 2025-02-11 | Stop reason: HOSPADM

## 2025-02-10 RX ORDER — SODIUM CHLORIDE, SODIUM LACTATE, POTASSIUM CHLORIDE, CALCIUM CHLORIDE 600; 310; 30; 20 MG/100ML; MG/100ML; MG/100ML; MG/100ML
INJECTION, SOLUTION INTRAVENOUS CONTINUOUS
Status: DISCONTINUED | OUTPATIENT
Start: 2025-02-10 | End: 2025-02-10 | Stop reason: HOSPADM

## 2025-02-10 RX ORDER — OXYCODONE HYDROCHLORIDE 5 MG/1
10 TABLET ORAL EVERY 4 HOURS PRN
Status: DISCONTINUED | OUTPATIENT
Start: 2025-02-10 | End: 2025-02-11 | Stop reason: HOSPADM

## 2025-02-10 RX ORDER — CEFAZOLIN SODIUM/WATER 2 G/20 ML
2 SYRINGE (ML) INTRAVENOUS
Status: COMPLETED | OUTPATIENT
Start: 2025-02-10 | End: 2025-02-10

## 2025-02-10 RX ORDER — FENTANYL CITRATE 0.05 MG/ML
25 INJECTION, SOLUTION INTRAMUSCULAR; INTRAVENOUS EVERY 5 MIN PRN
Status: DISCONTINUED | OUTPATIENT
Start: 2025-02-10 | End: 2025-02-10 | Stop reason: HOSPADM

## 2025-02-10 RX ORDER — PROCHLORPERAZINE MALEATE 5 MG/1
5 TABLET ORAL EVERY 6 HOURS PRN
Status: DISCONTINUED | OUTPATIENT
Start: 2025-02-10 | End: 2025-02-11 | Stop reason: HOSPADM

## 2025-02-10 RX ORDER — CYCLOSPORINE 0.5 MG/ML
1 EMULSION OPHTHALMIC 2 TIMES DAILY
Status: DISCONTINUED | OUTPATIENT
Start: 2025-02-10 | End: 2025-02-11 | Stop reason: HOSPADM

## 2025-02-10 RX ORDER — LABETALOL HYDROCHLORIDE 5 MG/ML
5 INJECTION, SOLUTION INTRAVENOUS
Status: COMPLETED | OUTPATIENT
Start: 2025-02-10 | End: 2025-02-10

## 2025-02-10 RX ORDER — HYDROMORPHONE HCL IN WATER/PF 6 MG/30 ML
0.2 PATIENT CONTROLLED ANALGESIA SYRINGE INTRAVENOUS EVERY 5 MIN PRN
Status: DISCONTINUED | OUTPATIENT
Start: 2025-02-10 | End: 2025-02-10 | Stop reason: HOSPADM

## 2025-02-10 RX ORDER — ONDANSETRON 2 MG/ML
INJECTION INTRAMUSCULAR; INTRAVENOUS PRN
Status: DISCONTINUED | OUTPATIENT
Start: 2025-02-10 | End: 2025-02-10

## 2025-02-10 RX ORDER — FENTANYL CITRATE 50 UG/ML
INJECTION, SOLUTION INTRAMUSCULAR; INTRAVENOUS PRN
Status: DISCONTINUED | OUTPATIENT
Start: 2025-02-10 | End: 2025-02-10

## 2025-02-10 RX ORDER — ACETAMINOPHEN 325 MG/1
650 TABLET ORAL EVERY 4 HOURS PRN
Status: DISCONTINUED | OUTPATIENT
Start: 2025-02-10 | End: 2025-02-11 | Stop reason: HOSPADM

## 2025-02-10 RX ORDER — MAGNESIUM HYDROXIDE 1200 MG/15ML
LIQUID ORAL PRN
Status: DISCONTINUED | OUTPATIENT
Start: 2025-02-10 | End: 2025-02-10 | Stop reason: HOSPADM

## 2025-02-10 RX ORDER — PROPOFOL 10 MG/ML
INJECTION, EMULSION INTRAVENOUS CONTINUOUS PRN
Status: DISCONTINUED | OUTPATIENT
Start: 2025-02-10 | End: 2025-02-10

## 2025-02-10 RX ORDER — LIDOCAINE 40 MG/G
CREAM TOPICAL
Status: DISCONTINUED | OUTPATIENT
Start: 2025-02-10 | End: 2025-02-11 | Stop reason: HOSPADM

## 2025-02-10 RX ORDER — DEXAMETHASONE SODIUM PHOSPHATE 4 MG/ML
4 INJECTION, SOLUTION INTRA-ARTICULAR; INTRALESIONAL; INTRAMUSCULAR; INTRAVENOUS; SOFT TISSUE
Status: DISCONTINUED | OUTPATIENT
Start: 2025-02-10 | End: 2025-02-10 | Stop reason: HOSPADM

## 2025-02-10 RX ORDER — AMOXICILLIN 250 MG
1 CAPSULE ORAL 2 TIMES DAILY
Status: DISCONTINUED | OUTPATIENT
Start: 2025-02-10 | End: 2025-02-11 | Stop reason: HOSPADM

## 2025-02-10 RX ORDER — LISINOPRIL 10 MG/1
10 TABLET ORAL DAILY
Status: DISCONTINUED | OUTPATIENT
Start: 2025-02-10 | End: 2025-02-11 | Stop reason: HOSPADM

## 2025-02-10 RX ORDER — ONDANSETRON 2 MG/ML
4 INJECTION INTRAMUSCULAR; INTRAVENOUS EVERY 30 MIN PRN
Status: DISCONTINUED | OUTPATIENT
Start: 2025-02-10 | End: 2025-02-10 | Stop reason: HOSPADM

## 2025-02-10 RX ORDER — SODIUM CHLORIDE 9 MG/ML
INJECTION, SOLUTION INTRAVENOUS CONTINUOUS
Status: DISCONTINUED | OUTPATIENT
Start: 2025-02-10 | End: 2025-02-11 | Stop reason: HOSPADM

## 2025-02-10 RX ORDER — ONDANSETRON 4 MG/1
4 TABLET, ORALLY DISINTEGRATING ORAL EVERY 30 MIN PRN
Status: DISCONTINUED | OUTPATIENT
Start: 2025-02-10 | End: 2025-02-10 | Stop reason: HOSPADM

## 2025-02-10 RX ORDER — BISACODYL 10 MG
10 SUPPOSITORY, RECTAL RECTAL DAILY PRN
Status: DISCONTINUED | OUTPATIENT
Start: 2025-02-10 | End: 2025-02-11 | Stop reason: HOSPADM

## 2025-02-10 RX ORDER — PROPOFOL 10 MG/ML
INJECTION, EMULSION INTRAVENOUS PRN
Status: DISCONTINUED | OUTPATIENT
Start: 2025-02-10 | End: 2025-02-10

## 2025-02-10 RX ORDER — METHOCARBAMOL 500 MG/1
500 TABLET, FILM COATED ORAL EVERY 6 HOURS PRN
Status: DISCONTINUED | OUTPATIENT
Start: 2025-02-10 | End: 2025-02-11 | Stop reason: HOSPADM

## 2025-02-10 RX ORDER — OXYCODONE HYDROCHLORIDE 5 MG/1
5 TABLET ORAL EVERY 4 HOURS PRN
Status: DISCONTINUED | OUTPATIENT
Start: 2025-02-10 | End: 2025-02-11 | Stop reason: HOSPADM

## 2025-02-10 RX ORDER — DEXAMETHASONE SODIUM PHOSPHATE 4 MG/ML
4 INJECTION, SOLUTION INTRA-ARTICULAR; INTRALESIONAL; INTRAMUSCULAR; INTRAVENOUS; SOFT TISSUE EVERY 6 HOURS
Status: COMPLETED | OUTPATIENT
Start: 2025-02-10 | End: 2025-02-11

## 2025-02-10 RX ORDER — HYDRALAZINE HYDROCHLORIDE 20 MG/ML
2.5-5 INJECTION INTRAMUSCULAR; INTRAVENOUS
Status: DISCONTINUED | OUTPATIENT
Start: 2025-02-10 | End: 2025-02-10 | Stop reason: HOSPADM

## 2025-02-10 RX ORDER — FENTANYL CITRATE 0.05 MG/ML
50 INJECTION, SOLUTION INTRAMUSCULAR; INTRAVENOUS EVERY 5 MIN PRN
Status: DISCONTINUED | OUTPATIENT
Start: 2025-02-10 | End: 2025-02-10 | Stop reason: HOSPADM

## 2025-02-10 RX ORDER — NALOXONE HYDROCHLORIDE 0.4 MG/ML
0.1 INJECTION, SOLUTION INTRAMUSCULAR; INTRAVENOUS; SUBCUTANEOUS
Status: DISCONTINUED | OUTPATIENT
Start: 2025-02-10 | End: 2025-02-10 | Stop reason: HOSPADM

## 2025-02-10 RX ORDER — LIDOCAINE HYDROCHLORIDE 20 MG/ML
INJECTION, SOLUTION INFILTRATION; PERINEURAL PRN
Status: DISCONTINUED | OUTPATIENT
Start: 2025-02-10 | End: 2025-02-10

## 2025-02-10 RX ORDER — DEXAMETHASONE SODIUM PHOSPHATE 10 MG/ML
10 INJECTION, SOLUTION INTRAMUSCULAR; INTRAVENOUS ONCE
Status: DISCONTINUED | OUTPATIENT
Start: 2025-02-10 | End: 2025-02-10 | Stop reason: HOSPADM

## 2025-02-10 RX ORDER — CLONAZEPAM 0.5 MG/1
1 TABLET ORAL 2 TIMES DAILY PRN
Status: DISCONTINUED | OUTPATIENT
Start: 2025-02-10 | End: 2025-02-11 | Stop reason: HOSPADM

## 2025-02-10 RX ORDER — POLYETHYLENE GLYCOL 3350 17 G/17G
17 POWDER, FOR SOLUTION ORAL DAILY
Status: DISCONTINUED | OUTPATIENT
Start: 2025-02-11 | End: 2025-02-11 | Stop reason: HOSPADM

## 2025-02-10 RX ORDER — ONDANSETRON 4 MG/1
4 TABLET, ORALLY DISINTEGRATING ORAL EVERY 6 HOURS PRN
Status: DISCONTINUED | OUTPATIENT
Start: 2025-02-10 | End: 2025-02-11 | Stop reason: HOSPADM

## 2025-02-10 RX ADMIN — ACETAMINOPHEN 975 MG: 325 TABLET, FILM COATED ORAL at 13:06

## 2025-02-10 RX ADMIN — FLUOXETINE HYDROCHLORIDE 40 MG: 20 CAPSULE ORAL at 13:30

## 2025-02-10 RX ADMIN — PHENYLEPHRINE HYDROCHLORIDE 100 MCG: 10 INJECTION INTRAVENOUS at 07:37

## 2025-02-10 RX ADMIN — Medication 200 MG: at 10:06

## 2025-02-10 RX ADMIN — ROCURONIUM BROMIDE 60 MG: 50 INJECTION, SOLUTION INTRAVENOUS at 07:37

## 2025-02-10 RX ADMIN — CEFAZOLIN SODIUM 2 G: 2 INJECTION, SOLUTION INTRAVENOUS at 15:00

## 2025-02-10 RX ADMIN — Medication 2 G: at 07:30

## 2025-02-10 RX ADMIN — MIDAZOLAM 2 MG: 1 INJECTION INTRAMUSCULAR; INTRAVENOUS at 07:31

## 2025-02-10 RX ADMIN — TRANEXAMIC ACID 1 G: 1 INJECTION, SOLUTION INTRAVENOUS at 09:12

## 2025-02-10 RX ADMIN — CEFAZOLIN SODIUM 2 G: 2 INJECTION, SOLUTION INTRAVENOUS at 22:32

## 2025-02-10 RX ADMIN — FENTANYL CITRATE 50 MCG: 50 INJECTION INTRAMUSCULAR; INTRAVENOUS at 07:36

## 2025-02-10 RX ADMIN — DEXAMETHASONE SODIUM PHOSPHATE 4 MG: 4 INJECTION, SOLUTION INTRAMUSCULAR; INTRAVENOUS at 13:37

## 2025-02-10 RX ADMIN — ROCURONIUM BROMIDE 40 MG: 50 INJECTION, SOLUTION INTRAVENOUS at 08:00

## 2025-02-10 RX ADMIN — VECURONIUM BROMIDE 1 MG: 1 INJECTION, POWDER, LYOPHILIZED, FOR SOLUTION INTRAVENOUS at 09:00

## 2025-02-10 RX ADMIN — DEXAMETHASONE SODIUM PHOSPHATE 4 MG: 4 INJECTION, SOLUTION INTRAMUSCULAR; INTRAVENOUS at 18:50

## 2025-02-10 RX ADMIN — LISINOPRIL 10 MG: 10 TABLET ORAL at 13:07

## 2025-02-10 RX ADMIN — LIDOCAINE HYDROCHLORIDE 60 MG: 20 INJECTION, SOLUTION INFILTRATION; PERINEURAL at 07:36

## 2025-02-10 RX ADMIN — ATORVASTATIN CALCIUM 10 MG: 10 TABLET, FILM COATED ORAL at 21:36

## 2025-02-10 RX ADMIN — FENTANYL CITRATE 50 MCG: 50 INJECTION INTRAMUSCULAR; INTRAVENOUS at 08:07

## 2025-02-10 RX ADMIN — CLONAZEPAM 1 MG: 0.5 TABLET ORAL at 21:36

## 2025-02-10 RX ADMIN — PHENYLEPHRINE HYDROCHLORIDE 0.4 MCG/KG/MIN: 10 INJECTION INTRAVENOUS at 07:51

## 2025-02-10 RX ADMIN — SODIUM CHLORIDE, POTASSIUM CHLORIDE, SODIUM LACTATE AND CALCIUM CHLORIDE: 600; 310; 30; 20 INJECTION, SOLUTION INTRAVENOUS at 07:30

## 2025-02-10 RX ADMIN — LABETALOL HYDROCHLORIDE 5 MG: 5 INJECTION INTRAVENOUS at 11:03

## 2025-02-10 RX ADMIN — ONDANSETRON 4 MG: 2 INJECTION INTRAMUSCULAR; INTRAVENOUS at 09:47

## 2025-02-10 RX ADMIN — SODIUM CHLORIDE: 9 INJECTION, SOLUTION INTRAVENOUS at 13:07

## 2025-02-10 RX ADMIN — PROPOFOL 200 MG: 10 INJECTION, EMULSION INTRAVENOUS at 07:36

## 2025-02-10 RX ADMIN — SENNOSIDES AND DOCUSATE SODIUM 1 TABLET: 50; 8.6 TABLET ORAL at 20:21

## 2025-02-10 RX ADMIN — DEXAMETHASONE SODIUM PHOSPHATE 10 MG: 4 INJECTION, SOLUTION INTRA-ARTICULAR; INTRALESIONAL; INTRAMUSCULAR; INTRAVENOUS; SOFT TISSUE at 07:37

## 2025-02-10 RX ADMIN — FENTANYL CITRATE 50 MCG: 50 INJECTION INTRAMUSCULAR; INTRAVENOUS at 09:34

## 2025-02-10 RX ADMIN — CYCLOSPORINE 1 DROP: 0.5 EMULSION OPHTHALMIC at 20:23

## 2025-02-10 RX ADMIN — FLUOXETINE HYDROCHLORIDE 40 MG: 20 CAPSULE ORAL at 20:21

## 2025-02-10 RX ADMIN — SENNOSIDES AND DOCUSATE SODIUM 1 TABLET: 50; 8.6 TABLET ORAL at 13:30

## 2025-02-10 RX ADMIN — PROPOFOL 25 MCG/KG/MIN: 10 INJECTION, EMULSION INTRAVENOUS at 07:37

## 2025-02-10 RX ADMIN — ACETAMINOPHEN 975 MG: 325 TABLET, FILM COATED ORAL at 20:21

## 2025-02-10 RX ADMIN — GABAPENTIN 100 MG: 100 CAPSULE ORAL at 06:29

## 2025-02-10 RX ADMIN — HYDROMORPHONE HYDROCHLORIDE 0.5 MG: 1 INJECTION, SOLUTION INTRAMUSCULAR; INTRAVENOUS; SUBCUTANEOUS at 08:40

## 2025-02-10 ASSESSMENT — ACTIVITIES OF DAILY LIVING (ADL)
ADLS_ACUITY_SCORE: 26
ADLS_ACUITY_SCORE: 45
ADLS_ACUITY_SCORE: 26
ADLS_ACUITY_SCORE: 45
ADLS_ACUITY_SCORE: 27
ADLS_ACUITY_SCORE: 27
ADLS_ACUITY_SCORE: 45
ADLS_ACUITY_SCORE: 27
ADLS_ACUITY_SCORE: 45
ADLS_ACUITY_SCORE: 26
ADLS_ACUITY_SCORE: 27
ADLS_ACUITY_SCORE: 45
ADLS_ACUITY_SCORE: 27
ADLS_ACUITY_SCORE: 45
ADLS_ACUITY_SCORE: 27
ADLS_ACUITY_SCORE: 22
ADLS_ACUITY_SCORE: 45
ADLS_ACUITY_SCORE: 25

## 2025-02-10 ASSESSMENT — ENCOUNTER SYMPTOMS
SEIZURES: 0
DYSRHYTHMIAS: 0

## 2025-02-10 ASSESSMENT — LIFESTYLE VARIABLES: TOBACCO_USE: 0

## 2025-02-10 ASSESSMENT — COPD QUESTIONNAIRES: COPD: 0

## 2025-02-10 NOTE — PROGRESS NOTES
Patient ambulation status:: Ax1 with GB  Patient able to stand for X-ray:Yes  Standing/upright x-ray complete: not yet, tomorrow.  Patient using oral analgesics:yes  Voiding spontaneously:yes  Drains discontinued:yes  Incision clean and dry:yes  Bowel status:BS active. On bowel regimen. LBM was yesterday.    Pt A&O x4. VSS ex BP slightly high. RA. Pain managed with schedule Tyl. Neck incision site dressing dry. CMS intact. BUE weak but improving. NS infusing. Tolerated regular diet well. Voided in the BR, bladder scan 40 ml.    Confirm with Dr. Shelby, no need to wear nek brace, but can wear soft brace for comfort.

## 2025-02-10 NOTE — ANESTHESIA PROCEDURE NOTES
Airway       Patient location during procedure: OR ([facility] - Operating Room or Procedural Area)       Procedure Start/Stop Times: 2/10/2025 7:39 AM  Staff -        Anesthesiologist:  Juancho Barry MD       CRNA: Cindy Oakes APRN CRNA       Performed By: CRNAIndications and Patient Condition       Indications for airway management: alex-procedural       Induction type:intravenous      Final Airway Details       Final airway type: endotracheal airway       Successful airway: ETT - single  Endotracheal Airway Details        ETT size (mm): 8.0       Cuffed: yes       Successful intubation technique: video laryngoscopy       VL Blade Size: Villela 4       Grade View of Cords: 1       Adjucts: stylet       Position: Right       Measured from: lips       Secured at (cm): 23       Bite block used: None    Post intubation assessment        Placement verified by: capnometry, equal breath sounds and chest rise        Number of attempts at approach: 1       Number of other approaches attempted: 0       Secured with: tape       Ease of procedure: easy       Dentition: Intact and Unchanged    Medication(s) Administered   Medication Administration Time: 2/10/2025 7:39 AM

## 2025-02-10 NOTE — ANESTHESIA CARE TRANSFER NOTE
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    Anesthesia Type:   General     Note:    Oropharynx: oropharynx clear of all foreign objects and spontaneously breathing  Level of Consciousness: drowsy  Oxygen Supplementation: face mask  Level of Supplemental Oxygen (L/min / FiO2): 6  Independent Airway: airway patency satisfactory and stable  Dentition: dentition unchanged  Vital Signs Stable: post-procedure vital signs reviewed and stable  Report to RN Given: handoff report given  Patient transferred to: PACU    Handoff Report: Identifed the Patient, Identified the Reponsible Provider, Reviewed the pertinent medical history, Discussed the surgical course, Reviewed Intra-OP anesthesia mangement and issues during anesthesia, Set expectations for post-procedure period and Allowed opportunity for questions and acknowledgement of understanding    Vitals:  Vitals Value Taken Time   /87 02/10/25 1014   Temp 37.5  C (99.5  F) 02/10/25 1025   Pulse 97 02/10/25 1025   Resp 20 02/10/25 1025   SpO2 90 % 02/10/25 1025   Vitals shown include unfiled device data.    Electronically Signed By: MINDA Rubin CRNA  February 10, 2025  10:27 AM

## 2025-02-10 NOTE — PROGRESS NOTES
Ortho Post-op Check    Subjective:  Seen on floor.  Pain well controlled.  Eating dinner, swallowing without difficulty.  Already noting improvement in myelopathic symptoms compared to preop, very happy with progress.    Objective:   General:  Alert  Respiratory:  Breathing easy  Musculoskeletal:      Spine:   Skin: intact about neck without evidence of infection or hematoma   Sensation:      R       L    C5:   Intact   Intact    C6:     Intact   Intact    C7:   Intact   Intact    C8:   Intact   Intact     Motor:     R L    C5: Deltoid   5  5    C6:   Biceps    5  5    C7: Triceps    5  5    C8:     5  5    T1: Intrinsics  5 5       Assessment/Plan:   74 year old male POD 0 s/p ACDF C5-6.  Recovering as expected.    - Continue current cares.        Jamie Shelby MD  Orthopaedic Spine Surgery  [facility]

## 2025-02-10 NOTE — OP NOTE
Orthopedic Surgery Operative Report    Patient:   Boris Zambrano  MRN:   [id number]   :  1950  Facility: [facility]   Date:  02/10/25         PREOPERATIVE DIAGNOSIS:    Cervical myelopathy    POSTOPERATIVE DIAGNOSIS:    Cervical myelopathy    PROCEDURE PERFORMED:    Anterior cervical discectomy and fusion C5-6  Application of anterior spinal plate C5-6  Placement of interbody cage C5-6  Use of nonstructural allograft combined with local autograft    SURGEON:    Jamie Shelby MD    SURGICAL ASSISTANT:    Analia Nix PA-C     ANESTHESIA:  General    FINDINGS:    Very large osteophyte formation along the anterior spine especially on the right.  Fixed kyphotic position of the neck prevented accessing of the C6-7 level via the anterior approach due to the height of the sternum.  Severe bilateral foraminal stenosis C5-6.    COMPLICATIONS:     None apart from inability to access the C6-7 level for ACDF at C6-7 as had been the initial operative plan.    SPECIMENS:    None    ESTIMATED BLOOD LOSS:  30    IMPLANTS:    Nuvasive Cohere interbody cage:  12 mm x 14 mm, 7 degree, 7 mm height  Nuvasive ACP 1.9H 20 mm anterior cervical plate with 3.5 mm screws - left C7 screw stripped during insertion but was still able to have the locking mechanism secure it.  MagnetOS nonstructural graft     INDICATION FOR OPERATION:  The patient is a 74 year old male who developed myelopathic symptoms related to the above diagnosis.  Conservative measures were not effective in controlling his symptoms.  We discussed ACDF C5-7 to include both the C5-6 central stenosis which was the source of his myelopathic symptoms, as well as the C6-7 level which could in the future cause radicular symptoms due to foraminal stenosis bilaterally, but was likely currently asymptomatic at C6-7.  I discussed with him the risks, benefits, and alternatives of the above operation and he wished to proceed.    DESCRIPTION OF  PROCEDURE:    The patient was met in the preoperative holding area and the operative site was confirmed and marked.  We once again reviewed the risks, benefits, and alternatives of the operation and the patient wished to proceed with the surgery.    The patient was taken back to the operating room and induced under general anesthesia.  The patient was positioned supine with all bony prominences well padded.  The surgical site was prepped and draped in the usual standard fashion.    I radiographically localized an appropriate site for the incision with a spinal needle laid on the skin.  I then incised the skin transversely in a skin fold on the right side of the neck medial to the sternocleidomastoid and performed a standard Cary-Jeffery approach.  I undermined the platysma and then incised it transversely.  I then opened the fascia medial to the sternocleidomastoid and then bluntly dissected down to the anterior spine, taking great care to make sure that the esophagus and trachea were not damaged medially, and the carotid sheath remained lateral to me.  I did not encounter the recurrent laryngeal nerve in the field.  After retracting the esophagus medially, I cleared off the tissue in the midline of the spine, thereby exposing the vertebral bodies and disc space.  I placed a snap on the annulus of the exposed disc, and then radiographically confirmed the correct C5-6 level, with two independent OR personnel also noting they counted this as the correct level.  I then continued my exposure to the vertebral body above and below the operative discs C5-7.  I elevated the longus coli bilaterally and placed my Trimline retractor below them.    At the C5-6 level I performed the following:  I took down the anterior osteophytes present.  I placed [place] pins into the vertebral bodies adjacent to the disc and distracted across the disc space.  I performed an annulotomy.  I then removed the disc with a combination of curettes  and Kerrisons.  I used a bur along the posterior osteophytes to get down to the posterior longitudinal ligament.  I used a evangelista also on the endplates to contour them appropriately for my cage.   I took down the posterior longitudinal ligament and visualized dura.  I performed a foraminotomy on both sides to ensure adequate space for the nerve roots.  After the foraminotomies I could easily pass a nerve hook out the foramina without resistance, confirming adequate decompression.    I next trialed for an selected a VeedMe Cohere porous PEEK interbody cage.  A 14 mm x 12 mm footprint 7 degree cage was most appropriate.  Cage height of 7 mm was most appropriate.  This cage was selected and packed with MagnetOS nonstructural graft combined with local autograft.  The cage was placed and found to have excellent stability with friction fit against the endplates.     I moved my retractors down to the C6-7 level and then took an x-ray to determine if I would be able to access this in line with the disc space.  Given the patient's rigid kyphotic deformity with high T1 slope, the manubrium blocked my ability to place a C7 Carmel pin in line with the disc space.  As C6-7 decompression would consequently require a partial C6 which in the setting of the patient's autofusion above and below would substantially increase the risk of mechanical postoperative complications, I felt that would be the safest option to abandon attempts at the ACDF C6-7, especially as this level was determined to be likely asymptomatic at present.    I contoured the C5-6 anterior osteophytes on the vertebral bodies to ensure that my plate would be able to sit flush.  I then selected a 20 mm Nuvasive ACP 1.9H plate and placed it on the anterior aspect of the operative vertebrae.  I used fluoroscopy to confirm appropriate position of this, and then placed screws into the vertebral bodies.   During insertion of the left C7 screw heads stripped, however as I  deployed the locking mechanisms on the plate the left C7 screw head was still able to be captured by the locking mechanism.    I achieved final hemostasis and thoroughly irrigated the surgical site.  I then began closure.  I closed the platysma with a running 3-0 Vicryl.  Subcutaneous tissues were closed with 4-0 Vicryl loosely, and then a running 4-0 Monocryl.  A sterile dressing and Tegaderm were placed over the incision.      The patient was allowed to emerge from anesthesia which occurred without incident and was transported to PACU in stable condition.      A surgical assistant was critical for this case to assist in retraction of the soft tissues and evacuating blood from the surgical field to facilitate a safer operation with improved visualization and less time under anesthesia.     All sponge and needle counts were correct at case conclusion.      POSTOPERATIVE PLAN:  -Activity:    Up with assist  -Weight Bearing Status: WBAT   -Bracing:   Soft collar PRN, may remove for comfort as desired  -Antibiotics:   Ancef x24h  -Anticoagulation:  SCDs only  -Pain control:   IV and PO, wean to PO as able  -Dressing:   Ok to shower with dressing in place, dressing ok to get wet.  -Diet:    ADAT  -Imaging:   XR C spine prior to discharge    -Disposition:   Pending medical stability, PT, anticipate discharge POD 1  -Follow up:   2 weeks in my clinic          Jamie Shelby MD

## 2025-02-10 NOTE — ANESTHESIA POSTPROCEDURE EVALUATION
Patient: Boris Zambrano    Procedure: Procedure(s):  Cervical 5 to cervical 6 anterior cervical discectomy and fusion       Anesthesia Type:  General    Note:     Postop Pain Control: Uneventful            Sign Out: Well controlled pain   PONV:    Neuro/Psych: Uneventful            Sign Out: Acceptable/Baseline neuro status   Airway/Respiratory: Uneventful            Sign Out: Acceptable/Baseline resp. status   CV/Hemodynamics: Uneventful            Sign Out: Acceptable CV status; No obvious hypovolemia; No obvious fluid overload   Other NRE:    DID A NON-ROUTINE EVENT OCCUR?            Last vitals:  Vitals Value Taken Time   /88 02/10/25 1150   Temp 37.4  C (99.32  F) 02/10/25 1151   Pulse 85 02/10/25 1152   Resp 23 02/10/25 1151   SpO2 96 % 02/10/25 1151   Vitals shown include unfiled device data.    Electronically Signed By: Juancho Barry MD  February 10, 2025  12:34 PM

## 2025-02-10 NOTE — PROGRESS NOTES
Dr Barry making rounds in PACU.    BP'S elevated: 160's/90.  Hr: 97.  Denies pain  No interventions at this time per Jhonny, continue to monitor.

## 2025-02-10 NOTE — PROVIDER NOTIFICATION
BP'S remain elevated; last 172/97.  HR; 93.  DENIES pain  Verbal order for 5mg IV Labetalol to be given.

## 2025-02-11 ENCOUNTER — APPOINTMENT (OUTPATIENT)
Dept: GENERAL RADIOLOGY | Facility: CLINIC | Age: 75
End: 2025-02-11
Attending: STUDENT IN AN ORGANIZED HEALTH CARE EDUCATION/TRAINING PROGRAM
Payer: COMMERCIAL

## 2025-02-11 VITALS
SYSTOLIC BLOOD PRESSURE: 114 MMHG | HEART RATE: 88 BPM | WEIGHT: 196 LBS | BODY MASS INDEX: 30.76 KG/M2 | TEMPERATURE: 98.7 F | OXYGEN SATURATION: 96 % | RESPIRATION RATE: 16 BRPM | HEIGHT: 67 IN | DIASTOLIC BLOOD PRESSURE: 60 MMHG

## 2025-02-11 LAB — HGB BLD-MCNC: 13.2 G/DL (ref 13.3–17.7)

## 2025-02-11 PROCEDURE — 999N000065 XR CERVICAL SPINE 2/3 VIEWS

## 2025-02-11 PROCEDURE — 36415 COLL VENOUS BLD VENIPUNCTURE: CPT | Performed by: STUDENT IN AN ORGANIZED HEALTH CARE EDUCATION/TRAINING PROGRAM

## 2025-02-11 PROCEDURE — 97166 OT EVAL MOD COMPLEX 45 MIN: CPT | Mod: GO | Performed by: OCCUPATIONAL THERAPIST

## 2025-02-11 PROCEDURE — 250N000011 HC RX IP 250 OP 636: Performed by: STUDENT IN AN ORGANIZED HEALTH CARE EDUCATION/TRAINING PROGRAM

## 2025-02-11 PROCEDURE — 85018 HEMOGLOBIN: CPT | Performed by: STUDENT IN AN ORGANIZED HEALTH CARE EDUCATION/TRAINING PROGRAM

## 2025-02-11 PROCEDURE — 97110 THERAPEUTIC EXERCISES: CPT | Mod: GO | Performed by: OCCUPATIONAL THERAPIST

## 2025-02-11 PROCEDURE — 97530 THERAPEUTIC ACTIVITIES: CPT | Mod: GO | Performed by: OCCUPATIONAL THERAPIST

## 2025-02-11 PROCEDURE — 250N000013 HC RX MED GY IP 250 OP 250 PS 637: Performed by: STUDENT IN AN ORGANIZED HEALTH CARE EDUCATION/TRAINING PROGRAM

## 2025-02-11 PROCEDURE — 97535 SELF CARE MNGMENT TRAINING: CPT | Mod: GO | Performed by: OCCUPATIONAL THERAPIST

## 2025-02-11 RX ORDER — AMOXICILLIN 250 MG
1-2 CAPSULE ORAL 2 TIMES DAILY
Qty: 56 TABLET | Refills: 0 | Status: SHIPPED | OUTPATIENT
Start: 2025-02-11 | End: 2025-02-25

## 2025-02-11 RX ORDER — OXYCODONE HYDROCHLORIDE 5 MG/1
5 TABLET ORAL EVERY 4 HOURS PRN
Qty: 20 TABLET | Refills: 0 | Status: SHIPPED | OUTPATIENT
Start: 2025-02-11 | End: 2025-02-11

## 2025-02-11 RX ORDER — OXYCODONE HYDROCHLORIDE 5 MG/1
5 TABLET ORAL EVERY 4 HOURS PRN
Qty: 20 TABLET | Refills: 0 | Status: SHIPPED | OUTPATIENT
Start: 2025-02-11

## 2025-02-11 RX ORDER — METHOCARBAMOL 500 MG/1
500 TABLET, FILM COATED ORAL EVERY 6 HOURS PRN
Qty: 30 TABLET | Refills: 0 | Status: SHIPPED | OUTPATIENT
Start: 2025-02-11

## 2025-02-11 RX ADMIN — ACETAMINOPHEN 975 MG: 325 TABLET, FILM COATED ORAL at 13:03

## 2025-02-11 RX ADMIN — DEXAMETHASONE SODIUM PHOSPHATE 4 MG: 4 INJECTION, SOLUTION INTRAMUSCULAR; INTRAVENOUS at 00:39

## 2025-02-11 RX ADMIN — CLONAZEPAM 1 MG: 0.5 TABLET ORAL at 09:36

## 2025-02-11 RX ADMIN — DEXAMETHASONE SODIUM PHOSPHATE 4 MG: 4 INJECTION, SOLUTION INTRAMUSCULAR; INTRAVENOUS at 06:37

## 2025-02-11 RX ADMIN — OXYCODONE HYDROCHLORIDE 5 MG: 5 TABLET ORAL at 04:43

## 2025-02-11 RX ADMIN — LISINOPRIL 10 MG: 10 TABLET ORAL at 09:31

## 2025-02-11 RX ADMIN — FLUOXETINE HYDROCHLORIDE 40 MG: 20 CAPSULE ORAL at 09:31

## 2025-02-11 RX ADMIN — ACETAMINOPHEN 975 MG: 325 TABLET, FILM COATED ORAL at 04:38

## 2025-02-11 ASSESSMENT — ACTIVITIES OF DAILY LIVING (ADL)
ADLS_ACUITY_SCORE: 27
PREVIOUS_RESPONSIBILITIES: MEAL PREP;HOUSEKEEPING;LAUNDRY;SHOPPING;MEDICATION MANAGEMENT;FINANCES;DRIVING
ADLS_ACUITY_SCORE: 27

## 2025-02-11 NOTE — PLAN OF CARE
Goal Outcome Evaluation:       Patient vital signs are at baseline: Yes Except for BP of 160/86 - 125/77  Patient able to ambulate as they were prior to admission or with assist devices provided by therapies during their stay:  Yes  Patient MUST void prior to discharge:  Yes  Patient able to tolerate oral intake:  Yes  Pain has adequate pain control using Oral analgesics:  Yes  Does patient have an identified :  Yes  Has goal D/C date and time been discussed with patient:  Yes    A&O x4, VSS on RA, CMS intact,Denies numbness or tingling, bilateral weakness of upper extremities.  Anterior neck dressing CDI. Denies difficulty swallowing. Up with standby assistance of 1 with use of IV Pole. PIV/saline Lock, drinking fluids well. Denies nausea, ate dinner. Pain managed with Tylenol, Oxycodone and Dexamethasone                 
Goal Outcome Evaluation:    Plan of Care Reviewed With: patient    Overall Patient Progress: improving    Outcome Evaluation: stable; no difficulty swallowing; possible discharge today    Reason for Admission: POD # 1 C5 - C6 discectomy and laminectomy    Cognitive/Mentation: A/Ox 4    Neuros/CMS: Intact     VS: VSS on RA    Tele: n/a.    /GI: Continent. Last BM 2/9/25.     Pulmonary: LS clear.    Pain: managed by scheduled tylenol.     Drains/Lines: SL PIV    Skin: anterior cervical incision    Activity: SBA    Diet: regular    Therapies recs: home with assist    Discharge: possibly today    Aggression Stoplight Tool: green    End of shift summary: Soft cervical collar PRN    
Physical Therapy: Orders received. Chart reviewed and discussed with care team.? Physical Therapy not indicated due to patient being IND with ambulation and stairs after OT eval this afternoon. Occupational therapist who did eval was gone for the day but her eval and discharge note were reviewed by writer and pt was moving independently with all mobility by end of session. Met with patient and discussed if he had any needs and patient feels he is comfortable going home, will have sister stay with him for some assist if needed.? Defer discharge recommendations to OT and care team.? Will complete orders.    
electronic

## 2025-02-11 NOTE — PROGRESS NOTES
"Ortho Progress Note     Subjective:  No acute overnight events. Mild throat soreness, as expected. Reports resolution of pre-operative shoulder and UE pain and is very pleased about this. Still with some generalized soreness and weakness in the hands; unchanged from prior. No new or worsening radicular pain, paresthesias, weakness. Has been OOB. Has voided.     He notes some mild swelling of the LLE, which was present pre-operatively as well. He is s/p L total knee arthroplasty performed by Dr. Marie in September 2024. No redness or pain. States he had this evaluated by his PCP pre-operatively with no acute concerns noted.     Was hypertensive overnight, BP has since normalized.     Objective:  /77   Pulse 81   Temp 98.3  F (36.8  C) (Oral)   Resp 15   Ht 1.702 m (5' 7\")   Wt 88.9 kg (196 lb)   SpO2 93%   BMI 30.70 kg/m    Gen: alert and appropriately interactive, no acute distress  CV: visible skin appears well-perfused, extremities warm to touch   Resp: breathing equal and non-labored, no wheezing  MSK:       LE: Mild swelling of the LLE compared to the RLE. No redness, warmth,  or pain.        Spine:   Skin: Surgical dressing CDI    Sensation:      R       L    C5:   Intact   Intact    C6:     Intact   Intact    C7:   Intact   Intact    C8:   Intact   Intact     Motor:     R L    C5: Deltoid   5  5    C6:   Biceps    5  5    C7: Triceps    5  5    C8:     4  4    T1: Intrinsics  5 5      Hemoglobin   Date Value Ref Range Status   06/17/2022 11.4 (L) 13.3 - 17.7 g/dL Final   06/16/2022 12.1 (L) 13.3 - 17.7 g/dL Final     Assessment/Plan:  POD 1 s/p C5-6 ACDF performed 2/10/2025. Progressing well with resolution of pre-operative shoulder and UE pain and is very pleased about this. Still with some generalized soreness and weakness in the hands; anticipate this will improve with further nerve recovery.     He notes some mild swelling of the LLE, which was present pre-operatively as well. He is s/p L " total knee arthroplasty performed by Dr. Marie in September 2024. No redness or pain. Offered xray and patient politely declines. No warmth or redness on exam. Recommend follow up with Dr. Marie after discharge. He should present to an urgent care or ER if acute redness, warmth, or pain develops after discharge. Discussed plan with Dr. Shelby who is in agreement.     Goals of the day: mobilization/evaluation with PT/OT, monitor independent void, pain control, xrays when able. Was hypertensive overnight, BP has since normalized. Continue monitoring and notify provider with concerns.       -Activity:                                       Up with assist  -Weight Bearing Status:            WBAT   -Bracing:                                      Soft collar PRN, may remove for comfort as desired  -Antibiotics:                                  Ancef x24h  -Anticoagulation:                        SCDs only  -Pain control:                               IV and PO, wean to PO as able  -Dressing:                                     Ok to shower with dressing in place, dressing ok to get wet.  -Diet:                                              ADAT  -Imaging:                                      XR C spine prior to discharge - pending     -Disposition:                                 Pending medical stability, PT, anticipate discharge later today   -Follow up:                                   2 weeks in [facility] clinic    Analia Nix PA-C  Orthopedic Spine Surgery  [facility]

## 2025-02-11 NOTE — DISCHARGE SUMMARY
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    HOSPITAL COURSE:    Surgery was uncomplicated. Boris Zambrano has done well post-operatively.     The patient received routine nursing cares and is medically stable. Has been intermittently hypertensive; asymptomatic. Vital signs are otherwise stable. Follow up with PCP in 1-2 weeks following discharge. The patient is tolerating a regular diet without GI distress/nausea or vomiting. Voiding spontaneously. All PT/OT goals have been met for safe mobility. Pain is now controlled on oral medications which will be available on discharge. Stool softeners have been used while taking pain medications to help prevent constipation.     Patient notes some mild swelling of the LLE, which was present pre-operatively as well. He is s/p L total knee arthroplasty performed by Dr. Marie in September 2024. No redness or pain. Offered xray and patient politely declines. No warmth or redness on exam. Recommend follow up with Dr. Marie after discharge. He should present to an urgent care or ER if acute redness, warmth, or pain develops after discharge. Discussed plan with Dr. Shelby who is in agreement.     Boris Zambrano is deemed medically safe to discharge.     Antibiotics:  Ancef given periop and 24 hours postop.  PT Progress:  Has met PT/OT goals for safe mobility.   Pain Meds:  Weaned off all IV pain meds by discharge.  Inpatient Events: No  significant events or complications.     Discharge orders and instructions as below.    FOLLOWUP:    Future Appointments   Date Time Provider Department Center   2/11/2025  3:45 PM Armando Saravia R, PT [facility]       Follow up in 2 weeks in Dr. Shelby's clinic  Follow up with PCP in 1-2 weeks for post-operative check in    Follow-up with Dr. Marie for further evaluation of the left knee      PLANNED DISCHARGE ORDERS:     DVT Prophylaxis: Mobilization        Current Discharge Medication List        START taking these medications    Details   methocarbamol (ROBAXIN) 500 MG tablet Take 1 tablet (500 mg) by mouth every 6 hours as needed for muscle spasms.  Qty: 30 tablet, Refills: 0    Associated Diagnoses: S/P cervical spinal fusion      naloxone (NARCAN) 4 MG/0.1ML nasal spray Spray 1 spray (4 mg) into one nostril alternating nostrils as needed for opioid reversal. every 2-3 minutes until assistance arrives  Qty: 2 each, Refills: 0    Associated Diagnoses: S/P cervical spinal fusion      oxyCODONE (ROXICODONE) 5 MG tablet Take 1 tablet (5 mg) by mouth every 4 hours as needed for moderate to severe pain. Do NOT take at the same time as Klonopin. Space out doses of oxycodone and klonopin by at least 8 hours.  Qty: 20 tablet, Refills: 0    Associated Diagnoses: S/P cervical spinal fusion      senna-docusate (SENOKOT-S/PERICOLACE) 8.6-50 MG tablet Take 1-2 tablets by mouth 2 times daily for 14 days. May discontinue once bowel movements become regular  Qty: 56 tablet, Refills: 0    Associated Diagnoses: S/P cervical spinal fusion           CONTINUE these medications which have NOT CHANGED    Details   atorvastatin (LIPITOR) 10 MG tablet Take 10 mg by mouth at bedtime.      cycloSPORINE (RESTASIS) 0.05 % ophthalmic emulsion Place 1 drop into both eyes 2 times daily      FLUoxetine (PROZAC) 20 MG capsule Take 40 mg by mouth 2 times daily (20 MG X 2 = 40 MG)      lisinopril (ZESTRIL) 10 MG tablet Take 1 tablet by  mouth daily      Multiple Vitamins-Minerals (MULTIVITAMIN MEN 50+) TABS Take 1 tablet by mouth daily.      Vitamin D3 (VITAMIN D, CHOLECALCIFEROL,) 25 mcg (1000 units) tablet Take 1 tablet by mouth daily.           STOP taking these medications       clonazePAM (KLONOPIN) 1 MG tablet Comments:   Reason for Stopping:         traMADol (ULTRAM) 50 MG tablet Comments:   Reason for Stopping:                 Discharge Procedure Orders   Notify Provider   Order Comments: Signs and symptoms of infection: Fever greater than 101, redness, swelling, heat at site, drainage, or pus     Discharge Instructions - Contact surgery team   Order Comments: Contact surgical team with any new swelling or tightness to the throat/incision site if it is causing discomfort.     Call 911 if it becomes difficult to breath as this is a medical emergency.     Discharge Instructions - No tub bathing   Order Comments: Tub bathing, swimming, or any other activities that will cause your incision to be submerged in water should be avoided until allowed by your Provider.     Discharge Instructions - Diet   Order Comments: Diet as tolerated. Return to diet before surgery. Begin with small bites/sips and soft foods. You may progress your diet as tolerated.     Discharge Instructions - Lifting Limit (specify)   Order Comments: Lifting limit of 5-10 pounds until seen at Post-op follow up appointment.     Return to Clinic - in 2 weeks   Order Comments: Return to Clinic in 2 weeks     No Aspirin or NSAID products   Order Comments: No aspirin or non-steroidal anti-inflammatory drugs (NSAIDs) such as ibuprofen or naproxen until cleared at post-operative appointment     No driving or operating machinery while in a cervical collar   Order Comments: Until follow-up appointment.     Reason for your hospital stay   Order Comments: C5-6 Anterior Cervical Discectomy and Fusion     Activity   Order Comments: Your activity upon discharge: weight bear as tolerated. See  formal activity restrictions in discharge instructions.     Order Specific Question Answer Comments   Is discharge order? Yes      Discharge Instructions   Order Comments: Care after Spine Surgery - Dr. Jamie Shelby     The following information will help you through your recovery at home.     Pain  - It is normal for you to experience some pain in the area of your incision after your surgery, and often between the shoulder blades.  This will improve over the coming couple of weeks. You may use ice and/or heat on your shoulders as tolerated. Do not place ice or heat directly on your incision or near your incision site.     - You should call Dr. Shelby's office if arm pain returns suddenly and does not improve over 24 hours.     Bracing:  - You should have received a soft cervical collar during the hospital.  You may wear this as needed for comfort, but you do not need to wear it if you do not find it comfortable.     Home Medications  - If you take a blood thinner (e.g. aspirin, warfarin, Xeralto, Eliquis, etc...) at baseline, wait until two days after the operation to start taking it again. This is to lower the risk of a blood collection pushing on the nerves in the surgery site.  If after starting your blood thinner again, if you notice severe worsening neck swelling or arm pain, contact Dr. Shelby's office immediately, as this could represent a blood collection forming.     Activity  - You may increase your activity as tolerated; walking is the best form of exercise after spine surgery.    - For six weeks after surgery avoid bending, lifting, and twisting (BLT).  Avoid activities such as vacuuming, raking and shoveling.   - Try to limit your lifting to 5-10 lbs during the first 2 weeks and then increase to 20-25lbs over the next 6 weeks.  - You may return to work approximately 1- 2 weeks after your surgery if you have a sedentary or desk type job.  If you have a physical job Dr. Shelby and his staff will help you  determine a return to work plan.    - You may resume sexual activity when you feel ready.  Stop if you have pain.     Driving  - You may drive if you feel strong enough, can comfortably rotate your neck, and are not taking any narcotic pain medications. If uncertain, wait until your 2-week post operative assessment.      Incision Site   - Keep a dressing over your incision for the first week.  If the dressing you left the hospital with remains intact and in place, with no water beneath it, you may leave this on.  If water gets underneath it, you should remove it and replace it with a dry gauze dressing (can place a tegaderm or tape over the top of the gauze).  Wash hands prior to changing the dressing. If after 1 week there is no drainage on dressing, you may leave the incision open to air.  If there is ongoing drainage five days after surgery, please contact Dr Shelby's office.     Pain Management   - Take your prescribed pain medication as needed and directed.  You may use Tylenol and methocarbamol (muscle relaxant) for baseline pain control. You may use the oxycodone for breakthrough pain. Plan to wean off of oxycodone over the next several days. You may continue to use Tylenol and methocarbamol for your discomfort when you no longer need the narcotic pain medication.     - Do not take the prescribed pain medication or muscle relaxant with any other home medications which cause drowsiness or sedation   - If you have had a fusion, do NOT use ibuprofen, meloxicam, naproxen, or other NSAIDs unless cleared by Dr. Shelby's team, as these types of  medications can inhibit your body's ability to fuse properly.      - If you need a refill on your pain medication call [phone]. Please allow 24 hours for your prescription to be refilled. Dr. Shelby's office does not refill pain medications on Friday afternoons.    Diet   - Start with eating soft foods and taking small bites. Gradually progress your diet as tolerated.   - Eat  a healthy diet; this will help your recovery.  - Drink plenty of fluids, water, milk or juice.  - Take your prescribed stool softener as directed.   - If you have trouble with constipation you should eat more fiber, drink more fluids, increase your walking or try an over the counter laxative.     Follow-up Visits  - You will see Analia Nix PA-C for your 2-week post-operative follow up appointment. You will see Dr. Shelby for your 6-week post-operative follow up appointment. You should have had both of these appointments scheduled for you at the same time your surgery was scheduled. If you did not, please call Dr. Shelby's office when you get home from your surgery.  - Write down any questions you have about your surgery, recovery, return to work and other topics you wished to be covered at your post-op visit.  This way, we will be able to address all of your questions at your next visit.  - Call Dr. Shelby's office if you have any questions or concerns.     When to Call your Doctor:  - If you have any redness, warmth or swelling at the incision site.  - If your incision opens up.  - If you have increasing drainage from your incision.  - If you have a temperature greater than 100.5 degrees Fahrenheit.  - If you develop dramatic swelling in the front of your neck.  If this swelling is making it difficult to breathe, go immediately to the emergency department, by ambulance if necessary.     Follow Up   Order Comments: Follow up with Dr. Marie ([facility]) within 7-14 days for left knee evaluation     Diet   Order Comments: Follow this diet upon discharge: Begin with small bites/sips and soft foods. You may progress your diet as tolerated.     Order Specific Question Answer Comments   Is discharge order? Yes      Hospital Follow-up with Existing Primary Care Provider (PCP)   Standing Status: Future Standing Exp. Date: 03/13/25   Order Comments: Please see details below          Order Specific Question Answer Comments    Schedule Primary Care visit within 7 Days        Had a thorough discussion with the patient regarding his postoperative pain plan.  He states he is addicted to Klonopin and has been on it for many years.  He has tried coming off of it in the past which did not go well and has been told by his managing provider to keep taking it. He is currently tolerating it well in the hospital while taking oxycodone concomitantly, without respiratory depression. We discussed that it is reasonable to continue, but that it is very important he does not take this at the same time as the oxycodone and that he should space out the two medications appropriately. I will also send him home with Narcan and we discussed this as well.    Analia Nix PA-C  Orthopaedic Spine Surgery  [facility]

## 2025-02-11 NOTE — PROGRESS NOTES
Occupational Therapy Discharge Summary    Reason for therapy discharge:    All goals and outcomes met, no further needs identified.    Progress towards therapy goal(s). See goals on Care Plan in [facility] electronic health record for goal details.  Goals met    Therapy recommendation(s):    Continue home exercise program. Pt lives in a townhouse alone and reports I with ADl/IADL's and functional mobility prior to admit. pt currently limited due to pain, post cervical fusion precautions, impaired strength, AROM. Pt reports if needed son will check on him, recommend A with all strenous IADL's ie shopping, heavier cleaning ie vacuuming, heavier laundry, transportation and showering to insure safety. recommend to complete B UE and hand AROM ex daily

## 2025-02-11 NOTE — PROGRESS NOTES
02/11/25 0948   Appointment Info   Signing Clinician's Name / Credentials (OT) Dorita GarcesOTR/L   Rehab Comments (OT) Initial Evaluation   Quick Adds   Quick Adds Certification   Living Environment   People in Home alone   Current Living Arrangements house  ([facility])   Home Accessibility stairs to enter home   Number of Stairs, Main Entrance 1   Transportation Anticipated family or friend will provide   Self-Care   Usual Activity Tolerance good   Current Activity Tolerance moderate   Regular Exercise No   Equipment Currently Used at Home grab bar, toilet   Fall history within last six months no   Activity/Exercise/Self-Care Comment walk in shower, suction grab bars. reacher and LHSH. Has ww and SEC at home.  (comfort height toilet. had L TKA 9/2024)   Instrumental Activities of Daily Living (IADL)   Previous Responsibilities meal prep;housekeeping;laundry;shopping;medication management;finances;driving   General Information   Onset of Illness/Injury or Date of Surgery 02/10/25   Referring Physician Analia Nix, ANDREW   Patient/Family Therapy Goal Statement (OT) home   Additional Occupational Profile Info/Pertinent History of Current Problem The patient is a 74 year old male who developed myelopathic symptoms related Cervical myelopathy, s/p POD #1 Cervical 5 to cervical 6 anterior cervical discectomy and fusion.   Existing Precautions/Restrictions fall;spinal;other (see comments)  (soft collar PRN, Sitting as tolerated. Avoid excessive forward or side bending, twisting, pushing, pulling, or reaching. No lifting greater than 5 pounds)   Cognitive Status Examination   Orientation Status orientation to person, place and time   Affect/Mental Status (Cognitive) WFL   Follows Commands WFL   Sensory   Sensory Comments reports B hands, shoulders numbness prior to surgery, but reports it's pretty much gone now after surgery.   Pain Assessment   Patient Currently in Pain Yes, see Vital Sign  flowsheet  (1/10 back of neck and throat.)   Range of Motion Comprehensive   Comment, General Range of Motion B UE AROM WFL, reports better B UE AROM   Strength Comprehensive (MMT)   Comment, General Manual Muscle Testing (MMT) Assessment B UE weakness and  strength prior to surgery, still some weakness but improving.   Bed Mobility   Rolling Left [place] (Bed Mobility) contact guard   Scooting/Bridging [place] (Bed Mobility) supervision   Supine-Sit [place] (Bed Mobility)   (SBA)   Transfer Skill: Bed to Chair/Chair to Bed   Bed-Chair [place] (Transfers)   (SBA)   Sit-Stand Transfer   Sit-Stand [place] (Transfers) supervision   Toilet Transfer   [place] Level (Toilet Transfer)   (SBA)   Upper Body Dressing Assessment/Training   [place] Level (Upper Body Dressing) minimum assist (75% patient effort)   Lower Body Dressing Assessment/Training   [place] Level (Lower Body Dressing) supervision   Clinical Impression   Criteria for Skilled Therapeutic Interventions Met (OT) Yes, treatment indicated   OT Diagnosis impaired I with ADL's and functional mobility.   OT Problem List-Impairments impacting ADL problems related to;activity tolerance impaired;strength;pain;post-surgical precautions;range of motion (ROM)   Assessment of Occupational Performance 3-5 Performance Deficits   Identified Performance Deficits impaired I with dressing, toilet and walk in shower transfer, shopping, cleaning, heavy laundry, etc.   Planned Therapy Interventions (OT) ADL retraining;transfer training;home program guidelines;progressive activity/exercise   Clinical Decision Making Complexity (OT) detailed assessment/moderate complexity   Risk & Benefits of therapy have been explained evaluation/treatment results reviewed;care plan/treatment goals reviewed;risks/benefits reviewed;current/potential barriers reviewed;participants voiced agreement with care plan;participants included;patient   OT Total  Evaluation Time   OT Eval, Moderate Complexity Minutes (04825) 10   Therapy Certification   Medical Diagnosis s/p POD #1 Cervical 5 to cervical 6 anterior cervical discectomy and fusion.   Start of Care Date 02/11/25   Certification date from 02/11/25   Certification date to 02/11/25   OT Goals   Therapy Frequency (OT) One time eval and treatment   OT Predicted Duration/Target Date for Goal Attainment 02/11/25   OT Goals Hygiene/Grooming;Upper Body Dressing;Lower Body Dressing;Toilet Transfer/Toileting;OT Goal 1;OT Goal 2   OT: Hygiene/Grooming independent;while standing;Goal Met   OT: Upper Body Dressing Modified independent;within precautions;including orthotic;Goal Met  (including soft collar)   OT: Lower Body Dressing Modified independent;using adaptive equipment;within precautions;Goal Met   OT: Toilet Transfer/Toileting Modified independent;toilet transfer;cleaning and garment management;using adaptive equipment;within precautions;Goal Met   OT: Goal 1 Pt will independently verbalize safe car transfer tech. GOAL MET AT EVAL.   OT: Goal 2 Pt will complete B hand sponge and B UE AROM including B hand ex with SBA/S with handouts. GOAL MET AT EVAL   Self-Care/Home Management   Self-Care/Home Mgmt/ADL, Compensatory, Meal Prep Minutes (76915) 30   Symptoms Noted During/After Treatment (Meal Preparation/Planning Training) fatigue   Treatment Detail/Skilled Intervention OT: Pt ed in spine precautions and  for reaching [place] and safe ADL's with handouts. Pt receptive to ed. pt ed in LE dress tech adhering to spine precautions, pt able to cross R LE to doff/more sock with mod I, but had L knee surgery last Sept and has difficulty, ed in reacher and sock aide use to doff/more socks after ed completed with S/mod I. Pt able more uw and pants after ed in tech with mod I including slip on shoes. Pt able to more t-shirt and sweatshirt after ed in tech mod I. pt ed in donning soft collar increased ease likely  looking in a mirror and donned SBA, min A to tighten velcro. pt doestn't have any concerns at this time.   Therapeutic Procedures/Exercise   Therapeutic Procedure: strength, endurance, ROM, flexibillity minutes (62357) 30   Symptoms Noted During/After Treatment fatigue   Treatment Detail/Skilled Intervention Pt ed in B UE AROM ex with focus on shoulders and completed approx 7 ex x approx 5 reps each shoulders to forearms and completed with handout SBA/S. pt ed in hand ex and completed 4 ex x 5 reps and blue foam ex with B hands and completed approx 7 ex x 5 reps each with SBA/S, pt with no further questions and feels ready to discharge home.   Therapeutic Activities   Therapeutic Activity Minutes (15905) 18   Symptoms noted during/after treatment fatigue   Treatment Detail/Skilled Intervention OT: pt ed in log roll tech to for supine to sit pt rpeorts has HOB that elevates at home and HOB raised approx 50-60 degrees and completed tech with cues to not use bedrail with Supervison/mod I. sit <> stand and ambulates with SBA/S and progressed to indendent. pt completed stairs using rail mod I slowly for safety. pt completed toilet transfer using grab bar safely and has grab bar at home.   OT Discharge Planning   OT Plan OT plan; dc OT   OT Rationale for DC Rec Pt lives in a townhouse alone and reports I with ADl/IADL's and functional mobility prior to admit. pt currently limited due to pain, post cervical fusion precautions, impaired strength, AROM. Pt reports if needed son will check on him, recommend A with all strenous IADL's ie shopping, heavier cleaning ie vacuuming, heavier laundry, transportation and showering to insure safety. recommend to complete B UE and hand AROM ex daily.   OT Brief overview of current status Goals of therapy will be to address safe mobility and make recs for d/c to next level of care. Pt and RN will continue to follow all falls risk precautions as documented by RN staff while hospitalized.   S/mod I B UE and LE dressing with AE, mod I toilet transfer   OT Total Distance Amb During Session (feet) 300   Total Session Time   Timed Code Treatment Minutes 78   Total Session Time (sum of timed and untimed services) 88     [facility]                                                                                   OUTPATIENT OCCUPATIONAL THERAPY    PLAN OF TREATMENT FOR OUTPATIENT REHABILITATION   Patient's Last Name, First Name, Boris Mcintosh YOB: 1950   Provider's Name   [facility]   Medical Record No.  [id number]     Onset Date: 02/10/25 Start of Care Date: 02/11/25     Medical Diagnosis:  s/p POD #1 Cervical 5 to cervical 6 anterior cervical discectomy and fusion.               OT Diagnosis:  impaired I with ADL's and functional mobility. Certification Dates:  From: 02/11/25  To: 02/11/25     See note for plan of treatment, functional goals, and certification details.    I CERTIFY THE NEED FOR THESE SERVICES FURNISHED UNDER        THIS PLAN OF TREATMENT AND WHILE UNDER MY CARE (Physician co-signature of this document indicates review and certification of the therapy plan).

## (undated) DEVICE — GOWN IMPERVIOUS SPECIALTY XL/XLONG 39049

## (undated) DEVICE — PROTECTOR ARM ONE-STEP TRENDELENBURG 40418

## (undated) DEVICE — TROCAR AIRSEAL BLADELESS 12X120MM IAS12-120

## (undated) DEVICE — RX SURGIFLO HEMOSTATIC MATRIX W/THROMBIN 8ML 2994

## (undated) DEVICE — SU MONOCRYL 0 CT-1 27" Y340H

## (undated) DEVICE — Device

## (undated) DEVICE — NDL INSUFFLATION 13GA 120MM C2201

## (undated) DEVICE — DRAIN JACKSON PRATT RESERVOIR 100ML SU130-1305

## (undated) DEVICE — SOL WATER IRRIG 1000ML BOTTLE 2F7114

## (undated) DEVICE — DRAIN JACKSON PRATT CHANNEL 19FR ROUND HUBLESS SIL JP-2230

## (undated) DEVICE — TUBING SUCTION MEDI-VAC SOFT 3/16"X20' N520A

## (undated) DEVICE — SU WND CLOSURE V-LOC 3-0 CV-23 6" VLOCM1904

## (undated) DEVICE — SU MONOCRYL 4-0 PS-2 18" UND Y496G

## (undated) DEVICE — GLOVE BIOGEL PI MICRO SZ 6.5 48565

## (undated) DEVICE — PREP CHLORAPREP W/ORANGE TINT 10.5ML 260715

## (undated) DEVICE — DRAIN PENROSE 0.25"X18" LATEX FREE GR201

## (undated) DEVICE — SPONGE COTTONOID 1/2X1/2" 80-1400

## (undated) DEVICE — SU CHROMIC 3-0 SH 27" G122H

## (undated) DEVICE — SU WND CLOSURE VLOC 90 ABS 3-0 VIOLET 6" CV-23 VLOCM0804

## (undated) DEVICE — SYR 10ML FINGER CONTROL W/O NDL 309695

## (undated) DEVICE — SUCTION MANIFOLD NEPTUNE 2 SYS 4 PORT 0702-020-000

## (undated) DEVICE — PACK MINOR SBA15MIFSE

## (undated) DEVICE — PACK SPINE SM CUSTOM SNE15SSFSK

## (undated) DEVICE — GLOVE BIOGEL PI MICRO INDICATOR UNDERGLOVE SZ 7.5 48975

## (undated) DEVICE — SU VICRYL 0 TIE 12X18" J906G

## (undated) DEVICE — CATH FOLEY COUNCIL 18FR 5ML LATEX 0196SI18

## (undated) DEVICE — SU SILK 2-0 TIE 24" SA75H

## (undated) DEVICE — SU VICRYL 0 UR-6 27" J603H

## (undated) DEVICE — SPONGE RAY-TEC 4X8" 7318

## (undated) DEVICE — SPONGE KITTNER 30-101

## (undated) DEVICE — GLOVE BIOGEL PI MICRO SZ 7.0 48570

## (undated) DEVICE — TAPE CLOTH ADHESIVE 3" LATEX FREE

## (undated) DEVICE — BAG DECANTER STERILE WHITE DYNJDEC09

## (undated) DEVICE — SUCTION TIP YANKAUER W/O VENT K86

## (undated) DEVICE — SYR EAR 3OZ BULB IRR STRL DISP BLU PVC 4173

## (undated) DEVICE — SPONGE SURGIFOAM 100 1974

## (undated) DEVICE — DRAPE MICROSCOPE LEICA 54X150" AR8033650

## (undated) DEVICE — DAVINCI XI SEAL UNIVERSAL 5-8MM 470361

## (undated) DEVICE — TAPE DURAPORE 3" SILK 1538-3

## (undated) DEVICE — DRAPE MAYO STAND 23X54 8337

## (undated) DEVICE — SUPPORTER ATHLETIC LG CLINITEX LF 67-1006

## (undated) DEVICE — TOOL DISSECT MIDAS MR8 12CM SP MATCH SYM-TRI MR8-SP12MH30T

## (undated) DEVICE — LINEN TOWEL PACK X5 5464

## (undated) DEVICE — GLOVE BIOGEL PI MICRO SZ 7.5 48575

## (undated) DEVICE — CATH FOLEY 3WAY 20FR 30ML LATEX 0167SI20

## (undated) DEVICE — TUBING CONMED AIRSEAL SMOKE EVAC INSUFFLATION ASM-EVAC

## (undated) DEVICE — GLOVE PROTEXIS W/NEU-THERA 7.5  2D73TE75

## (undated) DEVICE — DRSG DRAIN 4X4" 7086

## (undated) DEVICE — DRSG GAUZE 2X2" 8042

## (undated) DEVICE — CATH PLUG W/CAP 000076

## (undated) DEVICE — SU PDS II 0 CT-2 27" Z334H

## (undated) DEVICE — SU VICRYL 2-0 CT-2 27" UND J269H

## (undated) DEVICE — SYR 50ML LL W/O NDL 309653

## (undated) DEVICE — GLOVE PROTEXIS BLUE W/NEU-THERA 8.0  2D73EB80

## (undated) DEVICE — JELLY LUBRICATING SURGILUBE 4OZ TUBE

## (undated) DEVICE — IMM COLLAR CERVICAL MED UNIVERSAL 3X24" 79-83500

## (undated) DEVICE — KIT PATIENT POSITIONING PIGAZZI LATEX FREE 40580

## (undated) DEVICE — SU VICRYL 3-0 SH 27" J316H

## (undated) DEVICE — ESU PENCIL SMOKE EVAC W/ROCKER SWITCH 0703-047-000

## (undated) DEVICE — SOL WATER IRRIG 1000ML BOTTLE 07139-09

## (undated) DEVICE — SU SILK 2-0 FSL 18" 677G

## (undated) DEVICE — DRAPE LAP W/ARMBOARD 29410

## (undated) DEVICE — DAVINCI HOT SHEARS TIP COVER  400180

## (undated) DEVICE — DAVINCI XI GRASPER ENDOWRIST PROGRASP 470093

## (undated) DEVICE — DRAPE STERI TOWEL LG 1010

## (undated) DEVICE — DAVINCI XI MONOPOLAR SCISSORS HOT SHEARS 8MM 470179

## (undated) DEVICE — STRAP POSITIONING VELCRO 13' CERVICAL HARNESS 920877

## (undated) DEVICE — NDL SPINAL 18GA 3.5" 405184

## (undated) DEVICE — GLOVE BIOGEL PI MICRO INDICATOR UNDERGLOVE SZ 7.0 48970

## (undated) DEVICE — SU VICRYL+ 3-0 27IN SH UND VCP416H

## (undated) DEVICE — DAVINCI XI NDL DRIVER LARGE 470006

## (undated) DEVICE — BLADE KNIFE SURG 15 371115

## (undated) DEVICE — SOL NACL 0.9% INJ 1000ML BAG 2B1324X

## (undated) DEVICE — PIN DISTRACTION ANCHOR FOR SCR 14MM MDS9091414

## (undated) DEVICE — CATH FOLEY 18FR 5ML LATEX 0165SI18

## (undated) DEVICE — SUCTION IRR STRYKERFLOW II W/TIP 250-070-520

## (undated) DEVICE — SU SILK 2-0 SH 30" K833H

## (undated) DEVICE — SU VICRYL 0 CT-2 27" J334H

## (undated) DEVICE — DEVICE CATH STABILIZATION STATLOCK FOLEY 3-WAY FOL0105

## (undated) DEVICE — SU WND CLOSURE VLOC 180 ABS 2-0 9" GS-21 VLOCL0345

## (undated) DEVICE — SOL NACL 0.9% IRRIG 3000ML BAG 2B7477

## (undated) DEVICE — PREP SKIN SCRUB TRAY 4461A

## (undated) DEVICE — DRSG TEGADERM 2 3/8X2 3/4" 1624W

## (undated) DEVICE — DRSG KERLIX FLUFFS X5

## (undated) DEVICE — DRSG GAUZE 4X4" 3033

## (undated) DEVICE — ENDO POUCH UNIV RETRIEVAL SYSTEM INZII 10MM CD001

## (undated) DEVICE — SU DERMABOND DHV12

## (undated) DEVICE — DAVINCI XI DRAPE ARM 470015

## (undated) DEVICE — PACK DAVINCI UROLOGY SBA15UDFSG

## (undated) DEVICE — ESU GROUND PAD UNIVERSAL W/O CORD

## (undated) DEVICE — GLOVE PROTEXIS W/NEU-THERA 6.5  2D73TE65

## (undated) DEVICE — DAVINCI XI DRAPE COLUMN 470341

## (undated) DEVICE — SPONGE COTTONOID 1X3" 80-1408

## (undated) RX ORDER — LIDOCAINE HYDROCHLORIDE 20 MG/ML
INJECTION, SOLUTION EPIDURAL; INFILTRATION; INTRACAUDAL; PERINEURAL
Status: DISPENSED
Start: 2022-06-15

## (undated) RX ORDER — DEXAMETHASONE SODIUM PHOSPHATE 4 MG/ML
INJECTION, SOLUTION INTRA-ARTICULAR; INTRALESIONAL; INTRAMUSCULAR; INTRAVENOUS; SOFT TISSUE
Status: DISPENSED
Start: 2025-02-10

## (undated) RX ORDER — FENTANYL CITRATE 50 UG/ML
INJECTION, SOLUTION INTRAMUSCULAR; INTRAVENOUS
Status: DISPENSED
Start: 2024-06-25

## (undated) RX ORDER — FENTANYL CITRATE 0.05 MG/ML
INJECTION, SOLUTION INTRAMUSCULAR; INTRAVENOUS
Status: DISPENSED
Start: 2022-06-15

## (undated) RX ORDER — HYDROMORPHONE HCL IN WATER/PF 6 MG/30 ML
PATIENT CONTROLLED ANALGESIA SYRINGE INTRAVENOUS
Status: DISPENSED
Start: 2022-06-15

## (undated) RX ORDER — PROPOFOL 10 MG/ML
INJECTION, EMULSION INTRAVENOUS
Status: DISPENSED
Start: 2025-02-10

## (undated) RX ORDER — FENTANYL CITRATE 50 UG/ML
INJECTION, SOLUTION INTRAMUSCULAR; INTRAVENOUS
Status: DISPENSED
Start: 2022-06-15

## (undated) RX ORDER — LIDOCAINE HYDROCHLORIDE 20 MG/ML
JELLY TOPICAL
Status: DISPENSED
Start: 2020-03-06

## (undated) RX ORDER — HYDROMORPHONE HYDROCHLORIDE 1 MG/ML
INJECTION, SOLUTION INTRAMUSCULAR; INTRAVENOUS; SUBCUTANEOUS
Status: DISPENSED
Start: 2022-06-15

## (undated) RX ORDER — PROPOFOL 10 MG/ML
INJECTION, EMULSION INTRAVENOUS
Status: DISPENSED
Start: 2022-06-15

## (undated) RX ORDER — HYDROMORPHONE HYDROCHLORIDE 1 MG/ML
INJECTION, SOLUTION INTRAMUSCULAR; INTRAVENOUS; SUBCUTANEOUS
Status: DISPENSED
Start: 2025-02-10

## (undated) RX ORDER — ONDANSETRON 2 MG/ML
INJECTION INTRAMUSCULAR; INTRAVENOUS
Status: DISPENSED
Start: 2022-06-15

## (undated) RX ORDER — CEFAZOLIN SODIUM/WATER 2 G/20 ML
SYRINGE (ML) INTRAVENOUS
Status: DISPENSED
Start: 2022-06-15

## (undated) RX ORDER — PROPOFOL 10 MG/ML
INJECTION, EMULSION INTRAVENOUS
Status: DISPENSED
Start: 2024-06-25

## (undated) RX ORDER — FENTANYL CITRATE 50 UG/ML
INJECTION, SOLUTION INTRAMUSCULAR; INTRAVENOUS
Status: DISPENSED
Start: 2025-02-10

## (undated) RX ORDER — ACETAMINOPHEN 325 MG/1
TABLET ORAL
Status: DISPENSED
Start: 2024-06-25

## (undated) RX ORDER — CEFAZOLIN SODIUM 2 G/50ML
SOLUTION INTRAVENOUS
Status: DISPENSED
Start: 2024-06-25

## (undated) RX ORDER — LABETALOL HYDROCHLORIDE 5 MG/ML
INJECTION, SOLUTION INTRAVENOUS
Status: DISPENSED
Start: 2025-02-10

## (undated) RX ORDER — BUPIVACAINE HYDROCHLORIDE 2.5 MG/ML
INJECTION, SOLUTION EPIDURAL; INFILTRATION; INTRACAUDAL
Status: DISPENSED
Start: 2022-06-15

## (undated) RX ORDER — ONDANSETRON 2 MG/ML
INJECTION INTRAMUSCULAR; INTRAVENOUS
Status: DISPENSED
Start: 2025-02-10

## (undated) RX ORDER — HEPARIN SODIUM 5000 [USP'U]/.5ML
INJECTION, SOLUTION INTRAVENOUS; SUBCUTANEOUS
Status: DISPENSED
Start: 2022-06-15

## (undated) RX ORDER — DEXAMETHASONE SODIUM PHOSPHATE 4 MG/ML
INJECTION, SOLUTION INTRA-ARTICULAR; INTRALESIONAL; INTRAMUSCULAR; INTRAVENOUS; SOFT TISSUE
Status: DISPENSED
Start: 2022-06-15

## (undated) RX ORDER — GABAPENTIN 100 MG/1
CAPSULE ORAL
Status: DISPENSED
Start: 2025-02-10